# Patient Record
Sex: MALE | Race: WHITE | Employment: OTHER | URBAN - METROPOLITAN AREA
[De-identification: names, ages, dates, MRNs, and addresses within clinical notes are randomized per-mention and may not be internally consistent; named-entity substitution may affect disease eponyms.]

---

## 2022-12-14 ENCOUNTER — OFFICE VISIT (OUTPATIENT)
Dept: NEUROSURGERY | Age: 62
End: 2022-12-14
Payer: MEDICARE

## 2022-12-14 VITALS
BODY MASS INDEX: 27.3 KG/M2 | HEART RATE: 73 BPM | HEIGHT: 71 IN | SYSTOLIC BLOOD PRESSURE: 137 MMHG | OXYGEN SATURATION: 93 % | WEIGHT: 195 LBS | RESPIRATION RATE: 18 BRPM | DIASTOLIC BLOOD PRESSURE: 84 MMHG | TEMPERATURE: 97.6 F

## 2022-12-14 DIAGNOSIS — M54.41 CHRONIC RIGHT-SIDED LOW BACK PAIN WITH RIGHT-SIDED SCIATICA: Primary | ICD-10-CM

## 2022-12-14 DIAGNOSIS — M54.16 LUMBAR RADICULOPATHY: ICD-10-CM

## 2022-12-14 DIAGNOSIS — G89.29 CHRONIC RIGHT-SIDED LOW BACK PAIN WITH RIGHT-SIDED SCIATICA: Primary | ICD-10-CM

## 2022-12-14 PROCEDURE — 99203 OFFICE O/P NEW LOW 30 MIN: CPT | Performed by: STUDENT IN AN ORGANIZED HEALTH CARE EDUCATION/TRAINING PROGRAM

## 2022-12-14 PROCEDURE — 99202 OFFICE O/P NEW SF 15 MIN: CPT

## 2022-12-14 RX ORDER — CYCLOBENZAPRINE HCL 10 MG
10 TABLET ORAL 3 TIMES DAILY PRN
COMMUNITY

## 2022-12-14 RX ORDER — AMLODIPINE BESYLATE 5 MG/1
5 TABLET ORAL DAILY
COMMUNITY

## 2022-12-14 RX ORDER — TAMSULOSIN HYDROCHLORIDE 0.4 MG/1
0.4 CAPSULE ORAL DAILY
COMMUNITY

## 2022-12-14 RX ORDER — FINASTERIDE 5 MG/1
5 TABLET, FILM COATED ORAL DAILY
COMMUNITY

## 2022-12-14 RX ORDER — OMEPRAZOLE 20 MG/1
20 CAPSULE, DELAYED RELEASE ORAL DAILY
COMMUNITY

## 2022-12-14 RX ORDER — MAGNESIUM OXIDE 420 MG/1
TABLET ORAL
COMMUNITY

## 2022-12-14 RX ORDER — PREGABALIN 150 MG/1
150 CAPSULE ORAL 3 TIMES DAILY
COMMUNITY

## 2022-12-14 RX ORDER — MELOXICAM 15 MG/1
15 TABLET ORAL DAILY PRN
Qty: 30 TABLET | Refills: 0 | Status: SHIPPED | OUTPATIENT
Start: 2022-12-14

## 2022-12-14 RX ORDER — ROSUVASTATIN CALCIUM 20 MG/1
20 TABLET, COATED ORAL DAILY
COMMUNITY

## 2022-12-14 RX ORDER — LEVOTHYROXINE SODIUM 0.05 MG/1
50 TABLET ORAL DAILY
COMMUNITY

## 2022-12-14 ASSESSMENT — ENCOUNTER SYMPTOMS
SHORTNESS OF BREATH: 0
PHOTOPHOBIA: 0
BACK PAIN: 1
TROUBLE SWALLOWING: 0
ABDOMINAL PAIN: 0

## 2022-12-14 NOTE — PROGRESS NOTES
Subjective:      Patient ID: Ivelisse Barrios is a 56 y.o. male who has a PMH of previous microdiscectomy done about 20 years ago at [facility]. Today he presents for evaluation of acute on chronic low back pain. Patient states he has always had low back pain but this past Thanksgiving he started to have worsening low back pain that radiates down his right leg. He denies any trauma to the area. He admit to associated numbness and weakness with this pain. Pain is worse with movement. He has tried lyrica, heat, ice and ibuprofen with no relief. He has not tried recent PT for this pain, although has tried PT in the past for his chronic back pain with little relief. He has not tried [facility] for this pain. He denies any bowel or bladder incontinence. He is a current smoker, smoking 1ppd and denies any blood thinner usage. Review of Systems   Constitutional:  Negative for chills and fever. HENT:  Negative for trouble swallowing. Eyes:  Negative for photophobia. Respiratory:  Negative for shortness of breath. Cardiovascular:  Negative for chest pain. Gastrointestinal:  Negative for abdominal pain. Endocrine: Negative for heat intolerance. Genitourinary:  Negative for difficulty urinating and flank pain. Musculoskeletal:  Positive for arthralgias and back pain. Negative for myalgias. Skin:  Negative for wound. Neurological:  Positive for weakness and numbness. Negative for headaches. Psychiatric/Behavioral:  Negative for confusion. Objective:   Physical Exam  HENT:      Head: Normocephalic. Eyes:      Pupils: Pupils are equal, round, and reactive to light. Cardiovascular:      Rate and Rhythm: Normal rate. Pulmonary:      Effort: Pulmonary effort is normal.   Abdominal:      General: There is no distension. Musculoskeletal:         General: Normal range of motion. Cervical back: Normal range of motion. Skin:     General: Skin is warm and dry.    Neurological:      Mental Status: He

## 2022-12-21 ENCOUNTER — HOSPITAL ENCOUNTER (OUTPATIENT)
Dept: GENERAL RADIOLOGY | Age: 62
Discharge: HOME | End: 2022-12-23
Payer: MEDICARE

## 2022-12-21 ENCOUNTER — HOSPITAL ENCOUNTER (OUTPATIENT)
Dept: MRI IMAGING | Age: 62
Discharge: HOME | End: 2022-12-23
Payer: MEDICARE

## 2022-12-21 DIAGNOSIS — M54.41 CHRONIC RIGHT-SIDED LOW BACK PAIN WITH RIGHT-SIDED SCIATICA: ICD-10-CM

## 2022-12-21 DIAGNOSIS — G89.29 CHRONIC RIGHT-SIDED LOW BACK PAIN WITH RIGHT-SIDED SCIATICA: ICD-10-CM

## 2022-12-21 DIAGNOSIS — M54.16 LUMBAR RADICULOPATHY: ICD-10-CM

## 2022-12-21 PROCEDURE — 72148 MRI LUMBAR SPINE W/O DYE: CPT

## 2022-12-21 PROCEDURE — 72120 X-RAY BEND ONLY L-S SPINE: CPT

## 2023-01-03 ENCOUNTER — OFFICE VISIT (OUTPATIENT)
Dept: NEUROSURGERY | Age: 63
End: 2023-01-03
Payer: MEDICARE

## 2023-01-03 VITALS
HEIGHT: 71 IN | TEMPERATURE: 97.2 F | RESPIRATION RATE: 18 BRPM | WEIGHT: 195 LBS | BODY MASS INDEX: 27.3 KG/M2 | HEART RATE: 82 BPM | OXYGEN SATURATION: 96 % | SYSTOLIC BLOOD PRESSURE: 152 MMHG | DIASTOLIC BLOOD PRESSURE: 88 MMHG

## 2023-01-03 DIAGNOSIS — M54.16 LUMBAR RADICULOPATHY: Primary | ICD-10-CM

## 2023-01-03 PROCEDURE — 99204 OFFICE O/P NEW MOD 45 MIN: CPT | Performed by: NEUROLOGICAL SURGERY

## 2023-01-03 PROCEDURE — 99212 OFFICE O/P EST SF 10 MIN: CPT

## 2023-01-03 PROCEDURE — G8419 CALC BMI OUT NRM PARAM NOF/U: HCPCS | Performed by: NEUROLOGICAL SURGERY

## 2023-01-03 PROCEDURE — G8484 FLU IMMUNIZE NO ADMIN: HCPCS | Performed by: NEUROLOGICAL SURGERY

## 2023-01-03 PROCEDURE — 3017F COLORECTAL CA SCREEN DOC REV: CPT | Performed by: NEUROLOGICAL SURGERY

## 2023-01-03 PROCEDURE — 4004F PT TOBACCO SCREEN RCVD TLK: CPT | Performed by: NEUROLOGICAL SURGERY

## 2023-01-03 PROCEDURE — G8427 DOCREV CUR MEDS BY ELIG CLIN: HCPCS | Performed by: NEUROLOGICAL SURGERY

## 2023-01-03 NOTE — PROGRESS NOTES
[address] [facility] [place] NEUROSURGERY  [facility] [place] [id number]       Chief Complaint:   Chief Complaint   Patient presents with    Back Pain     Patient is having back pain  had MRI done and is here for results         HPI:     I had the pleasure of seeing Ac Mitchell today in neurosurgical clinic. As you know this 69-year-old right-handed  father of 3 recently former smoker occasional drinker on disability presents with a longstanding history of low back pain. He underwent lumbar laminectomy at L4-5 approximately 20 years ago with resultant permanent numbness and pain in the left lower extremity. Most recently now he is endorsing right lower extremity radiculopathy that extends from his buttocks down the lateral aspect of his leg anteriorly to the top of his foot. He also endorses numbness of the right calf anteriorly. He denies any change with cough strain or sneeze and denies any loss of bowel or bladder. He has not recently tried physical therapy or epidurals but has done this remotely some 20 years back without much benefit. He was seen and evaluated by our physician assistant and today presents with MRI and flexion-extension x-rays in hand. No past medical history on file. No past surgical history on file. No family history on file.    Social History     Socioeconomic History    Marital status:      Spouse name: Not on file    Number of children: Not on file    Years of education: Not on file    Highest education level: Not on file   Occupational History    Not on file   Tobacco Use    Smoking status: Every Day     Packs/day: 1.00     Years: 2.00     Pack years: 2.00     Types: Cigarettes     Start date: 2020    Smokeless tobacco: Never   Substance and Sexual Activity    Alcohol use: Yes    Drug use: Not Currently    Sexual activity: Not on file   Other Topics Concern    Not on file   Social History Narrative    Not on file     Social Determinants of Health     Financial Resource Strain: Not on file   Food Insecurity: Not on file   Transportation Needs: Not on file   Physical Activity: Not on file   Stress: Not on file   Social Connections: Not on file   Intimate Partner Violence: Not on file   Housing Stability: Not on file       Medications:   Current Outpatient Medications   Medication Sig Dispense Refill    amLODIPine (NORVASC) 5 MG tablet Take 5 mg by mouth daily      omeprazole (PRILOSEC) 20 MG delayed release capsule Take 20 mg by mouth daily      cyanocobalamin 1000 MCG tablet Take 1,000 mcg by mouth daily      pregabalin (LYRICA) 150 MG capsule Take 150 mg by mouth in the morning, at noon, and at bedtime. finasteride (PROSCAR) 5 MG tablet Take 5 mg by mouth daily      tamsulosin (FLOMAX) 0.4 MG capsule Take 0.4 mg by mouth daily      levothyroxine (SYNTHROID) 50 MCG tablet Take 50 mcg by mouth Daily      rosuvastatin (CRESTOR) 20 MG tablet Take 20 mg by mouth daily      Cholecalciferol 50 MCG (2000 UT) TABS Take by mouth      Magnesium Oxide 420 MG TABS Take by mouth      cyclobenzaprine (FLEXERIL) 10 MG tablet Take 10 mg by mouth 3 times daily as needed for Muscle spasms      meloxicam (MOBIC) 15 MG tablet Take 1 tablet by mouth daily as needed for Pain 30 tablet 0     No current facility-administered medications for this visit. Allergies:    Patient has no known allergies. Review of Systems:    Denies any chest pain, headache, dyspnea, recent weight loss, fevers, chills or night sweats. Physical Examination:    BP (!) 152/88   Pulse 82   Temp 97.2 °F (36.2 °C)   Resp 18   Ht 5' 11\" (1.803 m)   Wt 195 lb (88.5 kg)   SpO2 96%   BMI 27.20 kg/m²      On focused neurological examination, he  is awake alert oriented and rationally conversant. Speech is clear and fluent.   Pupils are equal and reactive to light bilaterally, extraocular movements are intact, visual fields are full to confrontation. His  face is symmetric and grossly intact to fine touch. Uvula and tongue are both midline. Shoulder shrug is symmetric and strong. Cervical spine is well aligned and nontender to direct palpation. He  can forward flex, extend , laterally rotate and laterally extend without limitation or pain. Motor examination reveals preserved power in the upper and lower extremities at 5 out of 5 throughout. Reflexes are symmetric and brisk. Plantar responses are downgoing. There is no clonus. She has numbness along the lateral and anterior aspect of his right calf. Straight leg raise is negative. Palpation of the spine reveals no kyphotic or scoliotic gross deformities. He  can forward flex to well below the knee. There is no pain to deep percussion nor palpation. ASSESSMENT:    I personally reviewed Nitza Kapoor's radiographic images, particularly his MRI of the lumbar spine dated 21 December 22 which demonstrates a large right paracentral disc extrusion at L4-5 and with rostral migration with severe compression of the exiting nerve roots. Flexion-extension x-rays dated the 21st as well failed to demonstrate any mobile listhesis. 1. Lumbar radiculopathy  -     [facility] - Physical Therapy, [address] [facility] Tee Hart MD, Pain Medicine, [address]:    I showed Mr. Maisha Nevarez and his wife present with him today as advocate the images and explained the findings. Certainly he deserves a trial of physical therapy with possible epidural injection 2 referrals have been placed as such. My suspicion is that given the size and extent of the disc extrusion he will ultimately require surgery but conservative treatment must be attempted first.  He will return to clinic should he worsen or fail to achieve benefit we will reevaluate in 6 weeks time.       Thank you so much for allowing us to participate in the care of this patient. Return in about 6 weeks (around 2/14/2023) for discuss possible surgical options. Electronically signed by Terri Jarquin MD on 1/3/2023 at 3:48 PM       NOTE: This report was transcribed using voice recognition software.  Every effort was made to ensure accuracy; however, inadvertent computerized transcription errors may be present

## 2023-01-12 ENCOUNTER — EVALUATION (OUTPATIENT)
Dept: PHYSICAL THERAPY | Age: 63
End: 2023-01-12

## 2023-01-12 DIAGNOSIS — M54.16 LUMBAR RADICULOPATHY: Primary | ICD-10-CM

## 2023-01-12 NOTE — PROGRESS NOTES
[facility]   Phone: [phone]   Fax: [phone]           Date:  2023   Patient: Barak Kapoor  : 1960  MRN: [id number]  Referring Provider: Mikael Rodriguez MD  [address] [place] [address]     Medical Diagnosis:   M54.16 (ICD-10-CM) - Lumbar radiculopathy      SUBJECTIVE:     Onset date: 17 years , recent flareup about Thanksgiving time     Mechanism of Injury: Reports back surgery about 16 years ago.  Has had trouble with L LE for 16 years, now R LE is giving pt problems.  Reports has been on Lyrica 16 years.  Reports tingling down B LE's, L LE is worse. Reports last week couldn't stand much. Better this week.     Previous PT: none    Medical Management for Current Problem:  scheduled next week for injections     Chief complaint: pain    Behavior: condition is getting worse    Pain:   Current: 6/10     Worst:'off the chart' /10           Provoking Activities/Positions: stand too long, sit too long, lay too much                  Relieving Activitie/Positions: sleep on L side     Disturbed Sleep: yes  Bladder Dysfunction: no  Bowel Dysfunction: no     Imaging results: MRI LUMBAR SPINE WO CONTRAST    Result Date: 2022  EXAMINATION: MRI OF THE LUMBAR SPINE WITHOUT CONTRAST, 2022 8:43 am TECHNIQUE: Multiplanar multisequence MRI of the lumbar spine was performed without the administration of intravenous contrast. COMPARISON: Lumbar spine radiographs on 2022. HISTORY: ORDERING SYSTEM PROVIDED HISTORY: Chronic right-sided low back pain with right-sided sciatica FINDINGS: BONES/ALIGNMENT: There is straightening of the lumbar spine.  There is no significant alexa or retrolisthesis.  There is an L3 hemangioma.  Endplate degenerative changes are most pronounced at L5-S1. No marrow edema to suggest an acute fracture. SPINAL CORD: The conus tip terminates near the level of the L1 vertebral body.  The cauda equina nerve roots are otherwise  unremarkable. SOFT TISSUES: No paraspinal mass identified. L1-L2: There is no significant disc herniation, spinal canal stenosis or neural foraminal narrowing. L2-L3: There is no significant disc herniation, spinal canal stenosis or neural foraminal narrowing. L3-L4: There is a disc bulge lateralizing to the right. There is a posterior midline annular tear in the disc. There is mild central spinal canal narrowing primarily related to a right paracentral/foraminal disc extrusion at L4-L5 with cephalad migration of disc. This displaces the right L4 nerve root in the lateral recess. Minimal bilateral foraminal narrowing, left side greater than right. L4-L5: There is a right paracentral/foraminal disc extrusion, with cephalad migration of disc measuring 6 mm in AP diameter that extends superiorly up to the level of the L3-L4 disc space with displacement of the right L4 nerve root in the lateral recess. Moderate central spinal canal narrowing. There is contact of the right L5 nerve root in the lateral recess with mild compression. There is facet arthropathy and ligamentum flavum thickening. Severe right and moderate left foraminal narrowing. L5-S1: There is a disc bulge-osteophyte complex lateralizing to the far right. Bilateral facet arthropathy. Mild central spinal canal narrowing. Moderate-severe right and severe left foraminal narrowing. There is compression of the exiting left L5 nerve root. 1. Large right paracentral/foraminal disc extrusion at L4-L5 with cephalad migration of disc, contacting the exiting right L4 and L5 nerve roots. 2. Moderate central spinal canal narrowing at L4-L5 and mild central spinal canal narrowing at L3-L4 and L5-S1. 3. Multilevel foraminal narrowing, as above, greatest on the right at L4-L5 and bilaterally at L5-S1. XR LUMBAR SPINE FLEXION AND EXTENSION ONLY    Result Date: 12/21/2022  EXAMINATION: 3 XRAY VIEWS OF THE LUMBAR SPINE 12/21/2022 7:37 am COMPARISON: None. HISTORY: ORDERING SYSTEM PROVIDED HISTORY: Chronic right-sided low back pain with right-sided sciatica FINDINGS: No spinal instability is evident on flexion or extension. Moderate degenerative disc disease at L5-S1, milder elsewhere. No fracture or dislocation. Nothing else active. Degenerative lumbar spondylosis. No instability is demonstrated. Past Medical History:  No past medical history on file. No past surgical history on file. Medications:   Current Outpatient Medications   Medication Sig Dispense Refill    amLODIPine (NORVASC) 5 MG tablet Take 5 mg by mouth daily      omeprazole (PRILOSEC) 20 MG delayed release capsule Take 20 mg by mouth daily      cyanocobalamin 1000 MCG tablet Take 1,000 mcg by mouth daily      pregabalin (LYRICA) 150 MG capsule Take 150 mg by mouth in the morning, at noon, and at bedtime. finasteride (PROSCAR) 5 MG tablet Take 5 mg by mouth daily      tamsulosin (FLOMAX) 0.4 MG capsule Take 0.4 mg by mouth daily      levothyroxine (SYNTHROID) 50 MCG tablet Take 50 mcg by mouth Daily      rosuvastatin (CRESTOR) 20 MG tablet Take 20 mg by mouth daily      Cholecalciferol 50 MCG (2000 UT) TABS Take by mouth      Magnesium Oxide 420 MG TABS Take by mouth      cyclobenzaprine (FLEXERIL) 10 MG tablet Take 10 mg by mouth 3 times daily as needed for Muscle spasms      meloxicam (MOBIC) 15 MG tablet Take 1 tablet by mouth daily as needed for Pain 30 tablet 0     No current facility-administered medications for this visit. 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 Exercise regimen: none    Hobbies: none    Patient Goals:  surgery/ want it fixed. Contraindications/Precautions: none    OBJECTIVE:     Observations: well nourished male       Gait: increased lateral sway, antalgic     Functional Strength:NT - toe walk, heel walk, and squat.     Range of Motion:    Trunk:    Flexion:  [] Normal   [x] Limited 20%   Extension:  [] Normal   [x] Limited 70%    Right Rotation: [] Normal   [x] Limited 60%   Left Rotation:  [] Normal   [x] Limited 50%   Right Side Bending: [] Normal   [x] Limited 10%   Left Side Bending: [] Normal   [x] Limited 10%    Lower Extremity:   Right:   [x] Normal   [] Limited    Left:   [x] Normal   [] Limited       Strength:     Trunk: decreased ROM, decreased strength   R LE: 4+/5   L LE: 4+/5    Palpation: Tender to palpation over L spine, sacrum , B SI, Non-tender to palpation T spine      Sensation: reports tingling down B LE's. Special Tests:   [] Nerve Root Compression           Right []+ / [] -    Left []+ / [] -  [] Slump           Right []+ / [x] -    Left []+ / [x] -  [] FADIR          Right []+ / [] -    Left []+ / [] -  [] S-I Distraction          Right []+ / [] -    Left []+ / [] -     [] SLR           Right []+ / [] -    Left []+ / [] -     [] AMINTA          Right []+ / [] -    Left []+ / [] -  [] S-I Compression          Right []+ / [] -    Left []+ / [] -   [] Other: []+ / [] -         ASSESSMENT     Outcome Measure:   Modified Oswestry 66% disability    Problems:   Pain reported   ROM decreased   Strength decreased  Decreased functional ability with walking, stairs, standing, sitting    [x] There are no barriers affecting plan of care or recovery    [] Barriers to this patient's plan of care or recovery include. Domestic Concerns:  [x] No  [] Yes:    Short Term goals (3 weeks)  Decrease reported pain to 0-7/10  Increase ROM by 10%  Increase Strength to 5-/5    Able to perform/complete the following functions/tasks: Pt able to walk 15 + minutes with min pain/limitation. Pt able to go up/down 5 steps with min pain/limitation. Pt able to stand 10 + minutes with min pain/limitation.     Oswestry Low Back Disability Questionnaire 50% disability    Long Term goals (6 weeks)  Decrease reported pain to 0-4/10  Increase ROM by 20%  Increase Strength to 5/5   Able to perform/complete the following functions/tasks: Pt able to walk 30 + minutes with no to min pain/limitation. Pt able to go up/down flight of steps with no to min pain/limitation. Pt able to stand 15 + minutes with no to min pain/limitation. Oswestry Low Back Disability Questionnaire 40% disability   Independent with Home Exercise Programs    Rehab Potential: [x] Good  [x] Fair  [] Poor    PLAN       Treatment Plan:   [x] Therapeutic Exercise  [x] Therapeutic Activity  [x] Neuromuscular Re-education   [x] Gait Training  [x] Balance Training  [x] Aerobic conditioning  [x] Manual Therapy  [x] Massage/Fascial release   [] Work/Sport specific activities    [] Pain Neuroscience [x] Cold/hotpack  [] Vasocompression  [x] Electrical Stimulation  [x] Lumbar/Cervical Traction  [x] Ultrasound   [] Iontophoresis: 4 mg/mL Dexamethasone Sodium Phosphate 40-80 mAmin        [x] Instruction in HEP      []  Medication allergies reviewed for use of Dexamethasone Sodium Phosphate 4mg/ml  with iontophoresis treatments. Patient is not allergic. The following CPT codes are likely to be used in the care of this patient: 54644 PT Evaluation: Low Complexity   43358 PT Re-Evaluation   46194 Therapeutic Exercise   94939 Neuromuscular Re-Education   78668 Therapeutic Activities   18680 Manual Therapy   43964 Mechanical Traction   29118 Gait Training    Electrical Stimulation  38074 US      Suggested Professional Referral: [x] No  [] Yes:     Patient Education:  [x] Plans/Goals, Risks/Benefits discussed  [x] Home exercise program  Method of Education: [x] Verbal  [x] Demo  [x] Written  Comprehension of Education:  [x] Verbalizes understanding. [x] Demonstrates understanding. [] Needs Review. [] Demonstrates/verbalizes understanding of HEP/Ed previously given.     Frequency:  2 days per week for 6 weeks    Patient understands diagnosis/prognosis and consents to treatment, plan and goals: [x] Yes    [] No     Thank you for the opportunity to work with your patient. If you have questions or comments, please contact me at numbers listed above. Electronically signed by: Paul Allen, PT DPT [id number]         Please sign Physician's Certification and return to: [address]  [address] [facility] [place] [address]  Dept: [phone]  Dept Fax: [phone]: [phone] Certification / Comments     Frequency/Duration 2 days per week for 6 weeks. Certification period from 1/12/2023  to 2/24/2023. I have reviewed the Plan of Care established for skilled therapy services and certify that the services are required and that they will be provided while the patient is under my care.     Physician's Comments/Revisions:               Physician's Printed Name:                                           [de-identified] Signature:                                                               Date:

## 2023-01-12 NOTE — PROGRESS NOTES
[address] [facility]   Phone: [phone]   Fax: [phone]      Physical Therapy Treatment Note    Date: 2023  Patient Name: Sheila Chambers  : 1960   MRN: [id number]  [facility]: years   DOSx: years   Referring Provider: Viki Cedillo MD  [address] [place] [address]. [facility],  [address] [facility]     Medical Diagnosis:   M54.16 (ICD-10-CM) - Lumbar radiculopathy    Outcome Measure:  Oswestry 66%    S: see eval  O:  Time 5147-1310     Visit  Repeat outcome measure at mid point and end. Pain 6/10     ROM      Modalities      MH + ES            Exercise      ALL EXERCISE DONE WITH DRAW-IN TECHNIQUE                            Functional activities To aid in reaching , pushing, pulling tasks at home     ROWS: H  \"    ROWS: M  \"    ROWS: L  \"    Obliques - high  \"    Obliques - low  \"     THEREX     Bike      Punches      Lat pulldowns      Triceps ext standing      Marching            Trunk ext TB      Trunk flex TB      Hip abd      Hip EXT      TG Squats                  A:  Tolerated fairly well.       P: Continue with rehab plan  Mary Ann Connell, PT  PT DPT [id number]    Treatment Charges: Mins Units   Initial Evaluation 30 1   Re-Evaluation     Ther Exercise         TE     Manual Therapy     MT     Ther Activities        TA     Gait Training          GT     Neuro Re-education NR     Modalities     Non-Billable Service Time     Other     Total Time/Units 30 1

## 2023-01-16 ENCOUNTER — TREATMENT (OUTPATIENT)
Dept: PHYSICAL THERAPY | Age: 63
End: 2023-01-16
Payer: MEDICARE

## 2023-01-16 DIAGNOSIS — M54.16 LUMBAR RADICULOPATHY: Primary | ICD-10-CM

## 2023-01-16 PROCEDURE — 97110 THERAPEUTIC EXERCISES: CPT | Performed by: PHYSICAL THERAPIST

## 2023-01-16 NOTE — PROGRESS NOTES
[address] [facility]   Phone: [phone]   Fax: [phone]      Physical Therapy Treatment Note    Date: 2023  Patient Name: Gray Devries  : 1960   MRN: [id number]  [facility]: years   DOSx: years   Referring Provider: Garrick Gandara MD  [address] [place] [address]. [place],  [address] [facility]     Medical Diagnosis:   M54.16 (ICD-10-CM) - Lumbar radiculopathy    Outcome Measure:  Oswestry 66%    S: reports pain -8/10 today, 'about the same.'   O:  Time 1200 - 1231     Visit  Repeat outcome measure at mid point and end. Pain See above. ROM      Modalities      MH + ES            Exercise      ALL EXERCISE DONE WITH DRAW-IN TECHNIQUE                            Functional activities To aid in reaching , pushing, pulling tasks at home     ROWS: H  \"    ROWS: M 2 x 10   \"    ROWS: L  \"    Obliques - high  \"    Obliques - low  \"     THEREX     Bike 8 minutes      Punches      Lat pulldowns 2 x 10       Triceps ext standing      Marching      Hip adduction  20 x  Ball  te   Sit To stands  10x  No UE support, blue foam     PPT  20x      Hip abd 20x  GTB     Hip EXT standing  10 x      LTR  10x B      Supine marching with core activation  10 x B      Bridges  X 10      A:  Tolerated fairly well.       P: Continue with rehab plan  Maryjane Mcgill, PT  PT DPT [id number]    Treatment Charges: Mins Units   Initial Evaluation     Re-Evaluation     Ther Exercise         TE 31 2   Manual Therapy     MT     Ther Activities        TA     Gait Training          GT     Neuro Re-education NR     Modalities     Non-Billable Service Time     Other     Total Time/Units 31 2

## 2023-01-18 ENCOUNTER — OFFICE VISIT (OUTPATIENT)
Dept: PAIN MANAGEMENT | Age: 63
End: 2023-01-18
Payer: MEDICARE

## 2023-01-18 VITALS
WEIGHT: 200 LBS | TEMPERATURE: 96.6 F | SYSTOLIC BLOOD PRESSURE: 124 MMHG | BODY MASS INDEX: 28 KG/M2 | OXYGEN SATURATION: 97 % | RESPIRATION RATE: 18 BRPM | HEART RATE: 83 BPM | DIASTOLIC BLOOD PRESSURE: 78 MMHG | HEIGHT: 71 IN

## 2023-01-18 DIAGNOSIS — M54.16 LUMBAR RADICULOPATHY: Primary | ICD-10-CM

## 2023-01-18 DIAGNOSIS — G62.9 PERIPHERAL POLYNEUROPATHY: ICD-10-CM

## 2023-01-18 DIAGNOSIS — M47.816 LUMBAR SPONDYLOSIS: ICD-10-CM

## 2023-01-18 PROCEDURE — 3017F COLORECTAL CA SCREEN DOC REV: CPT | Performed by: STUDENT IN AN ORGANIZED HEALTH CARE EDUCATION/TRAINING PROGRAM

## 2023-01-18 PROCEDURE — 4004F PT TOBACCO SCREEN RCVD TLK: CPT | Performed by: STUDENT IN AN ORGANIZED HEALTH CARE EDUCATION/TRAINING PROGRAM

## 2023-01-18 PROCEDURE — G8427 DOCREV CUR MEDS BY ELIG CLIN: HCPCS | Performed by: STUDENT IN AN ORGANIZED HEALTH CARE EDUCATION/TRAINING PROGRAM

## 2023-01-18 PROCEDURE — G8419 CALC BMI OUT NRM PARAM NOF/U: HCPCS | Performed by: STUDENT IN AN ORGANIZED HEALTH CARE EDUCATION/TRAINING PROGRAM

## 2023-01-18 PROCEDURE — G8484 FLU IMMUNIZE NO ADMIN: HCPCS | Performed by: STUDENT IN AN ORGANIZED HEALTH CARE EDUCATION/TRAINING PROGRAM

## 2023-01-18 PROCEDURE — 99204 OFFICE O/P NEW MOD 45 MIN: CPT | Performed by: STUDENT IN AN ORGANIZED HEALTH CARE EDUCATION/TRAINING PROGRAM

## 2023-01-18 RX ORDER — SODIUM CHLORIDE 9 MG/ML
INJECTION, SOLUTION INTRAVENOUS PRN
OUTPATIENT
Start: 2023-01-18

## 2023-01-18 RX ORDER — SODIUM CHLORIDE 0.9 % (FLUSH) 0.9 %
5-40 SYRINGE (ML) INJECTION PRN
OUTPATIENT
Start: 2023-01-18

## 2023-01-18 RX ORDER — SODIUM CHLORIDE 0.9 % (FLUSH) 0.9 %
5-40 SYRINGE (ML) INJECTION EVERY 12 HOURS SCHEDULED
OUTPATIENT
Start: 2023-01-18

## 2023-01-18 NOTE — PROGRESS NOTES
Patient:  Dayna Pastrana,  1960  Date of Service:  23      Do you currently have any of the following:    Fever: No  Headache:  No  Cough: No  Shortness of breath: No  Exposed to anyone with these symptoms: No       Patient presents to [place] [address] with complaints of back pain  pain that started 18 years ago and has been getting worse. He states the pain began following No specific cause    Pain is constant and is described as aching, throbbing, and stabbing. He rates the pain as a 9/10 on his worst day , 6/10 on his best day, and a 7/10 on average on the VAS scale. Pain does radiate to right leg and left leg. He  has numbness, tingling of the right leg and left leg. Alleviating factors include: nothing. Aggravating factors include:  walking, standing, sitting, lifting. He states that the pain does keep him from sleeping at night. He took his last dose of Lyrica this morning. He is not on NSAIDS and  is not on anticoagulation medications   Previous treatments: Physical Therapy, Nerve block, Epidural Steroid Injection, and Surgery . Personal Expectations from this treatment: increase activity and decrease pain    /78   Pulse 83   Temp (!) 96.6 °F (35.9 °C) (Temporal)   Resp 18   Ht 5' 11\" (1.803 m)   Wt 200 lb (90.7 kg)   SpO2 97%   BMI 27.89 kg/m²     No LMP for male patient.

## 2023-01-18 NOTE — PROGRESS NOTES
[address] Pain Medicine  90 [facility], [address] [place] [address]  Pain Medicine Consult Note    Patient:  Bishop Steiner,  1960  Date of Service:  23  Requesting Physician:  Trixie Capellan MD  Chief Complaint: New Patient (Lumbar )      HISTORY OF PRESENT ILLNESS:      Mr. Bishop Steiner is a 57 y.o. male presented today for evaluation of  LBP and b/l LE Pain that has been ongoing for the past 3 months    He has a PMH of HTN, hypothyroidism, BPH, GERD, HLD and peripheral neuropathy. [place] He is not diabetic. He had an 2006 that involved an L5-S1 diskectomy performed in [place], [place], by Dr. Mary Vines. Since the surgery has had permanent numbness/tingling/pain in the LLE from the buttock to the anterior thigh to the dorsum of the foot. There is also some right calf numbness. He was evaluated by dr. Casey Manley for this issue and was referred to us and PT and would be a surgical candidate should he fail conservative therapy first.    Pain is constant and is described as aching, throbbing, and stabbing. He rates the pain as a 9/10 on his worst day , 6/10 on his best day, and a 7/10 on average on the VAS scale. Pain does radiate to right leg and left leg. He  has numbness, tingling of the right leg and left leg. Alleviating factors include: nothing. Aggravating factors include:  walking, standing, sitting, lifting. He states that the pain does keep him from sleeping at night. He took his last dose of Lyrica this morning. He is on NSAIDs (Mobic) and  is not on anticoagulation medications     Previous treatments: Physical Therapy, Nerve block, Epidural Steroid Injection, and Surgery .       Previous treatments:      Physical Therapy : yes, enrolled currently    Chiropractic treatment: no   Current Medications:  NSAID's : yes - meloxicam   Membrane stabilizers : yes - lyrica 150 TID   Opioids : no   Adjuvants or Others : yes, flexeril    TENS Unit: no   Surgeries: yes, L4/5 Laminectomy    Interventional Pain procedures/ nerve blocks: yes, year ago      Current Medications:   Cholecalciferol, Magnesium Oxide, amLODIPine, cyanocobalamin, cyclobenzaprine, finasteride, levothyroxine, meloxicam, omeprazole, pregabalin, rosuvastatin, and tamsulosin     Social History:  Work Hx: disability   Currently in Litigation: no    H/O Smoking: quit 2023   H/O alcohol abuse : no   H/O Illicit drug use : occasional marijuana   Social History     Substance and Sexual Activity   Drug Use Not Currently        Personal Expectations from this treatment:  Decrease pain and/or swelling Pain control   Opioid Agreement:   Renewal date:      Last Urine Screen:   No results found for: LABAMPH, BARBSCNU, LABBENZ, CANSU, COCAIMETSCRU, OPIATESCREENURINE, PHENCYCLIDINESCREENURINE, LABMETH, OXYCODONEUR, FENTSCRUR, DSCOMMENT    Imaging:   XR Lumbar flex/ex      FINDINGS:   No spinal instability is evident on flexion or extension.  Moderate   degenerative disc disease at L5-S1, milder elsewhere.  No fracture or   dislocation.  Nothing else active.           Impression   Degenerative lumbar spondylosis.  No instability is demonstrated.         MRI Result (most recent):  MRI LUMBAR SPINE WO CONTRAST 12/21/2022    Narrative  EXAMINATION:  MRI OF THE LUMBAR SPINE WITHOUT CONTRAST, 12/21/2022 8:43 am    TECHNIQUE:  Multiplanar multisequence MRI of the lumbar spine was performed without the  administration of intravenous contrast.    COMPARISON:  Lumbar spine radiographs on 12/21/2022.    HISTORY:  ORDERING SYSTEM PROVIDED HISTORY: Chronic right-sided low back pain with  right-sided sciatica    FINDINGS:  BONES/ALIGNMENT: There is straightening of the lumbar spine.  There is no  significant alexa or retrolisthesis.  There is an L3 hemangioma.  Endplate  degenerative changes are most pronounced at L5-S1.    No marrow edema to suggest an acute fracture.    SPINAL CORD: The conus tip terminates near the level of the L1  vertebral  body. The cauda equina nerve roots are otherwise unremarkable. SOFT TISSUES: No paraspinal mass identified. L1-L2: There is no significant disc herniation, spinal canal stenosis or  neural foraminal narrowing. L2-L3: There is no significant disc herniation, spinal canal stenosis or  neural foraminal narrowing. L3-L4: There is a disc bulge lateralizing to the right. There is a posterior  midline annular tear in the disc. There is mild central spinal canal  narrowing primarily related to a right paracentral/foraminal disc extrusion  at L4-L5 with cephalad migration of disc. This displaces the right L4 nerve  root in the lateral recess. Minimal bilateral foraminal narrowing, left side  greater than right. L4-L5: There is a right paracentral/foraminal disc extrusion, with cephalad  migration of disc measuring 6 mm in AP diameter that extends superiorly up to  the level of the L3-L4 disc space with displacement of the right L4 nerve  root in the lateral recess. Moderate central spinal canal narrowing. There  is contact of the right L5 nerve root in the lateral recess with mild  compression. There is facet arthropathy and ligamentum flavum thickening. Severe right and moderate left foraminal narrowing. L5-S1: There is a disc bulge-osteophyte complex lateralizing to the far  right. Bilateral facet arthropathy. Mild central spinal canal narrowing. Moderate-severe right and severe left foraminal narrowing. There is  compression of the exiting left L5 nerve root. Impression  1. Large right paracentral/foraminal disc extrusion at L4-L5 with cephalad  migration of disc, contacting the exiting right L4 and L5 nerve roots. 2. Moderate central spinal canal narrowing at L4-L5 and mild central spinal  canal narrowing at L3-L4 and L5-S1.  3. Multilevel foraminal narrowing, as above, greatest on the right at L4-L5  and bilaterally at L5-S1.       CT Result (most recent):  No results found for this or any previous visit from the past 3650 days. Pertinent Labs:   No results found for: BUN, CREATININE, GLUCOSE, AST, ALT, LABA1C, INR, PTT, PLT, MRSAC    Past Medical History:   Diagnosis Date    Low back pain        Past Surgical History:   Procedure Laterality Date    BACK SURGERY         Prior to Admission medications    Medication Sig Start Date End Date Taking? Authorizing Provider   amLODIPine (NORVASC) 5 MG tablet Take 5 mg by mouth daily   Yes Historical Provider, MD   omeprazole (PRILOSEC) 20 MG delayed release capsule Take 20 mg by mouth daily   Yes Historical Provider, MD   cyanocobalamin 1000 MCG tablet Take 1,000 mcg by mouth daily   Yes Historical Provider, MD   pregabalin (LYRICA) 150 MG capsule Take 150 mg by mouth in the morning, at noon, and at bedtime.    Yes Historical Provider, MD   finasteride (PROSCAR) 5 MG tablet Take 5 mg by mouth daily   Yes Historical Provider, MD   tamsulosin (FLOMAX) 0.4 MG capsule Take 0.4 mg by mouth daily   Yes Historical Provider, MD   levothyroxine (SYNTHROID) 50 MCG tablet Take 50 mcg by mouth Daily   Yes Historical Provider, MD   rosuvastatin (CRESTOR) 20 MG tablet Take 20 mg by mouth daily   Yes Historical Provider, MD   Cholecalciferol 50 MCG (2000 UT) TABS Take by mouth   Yes Historical Provider, MD   Magnesium Oxide 420 MG TABS Take by mouth   Yes Historical Provider, MD   cyclobenzaprine (FLEXERIL) 10 MG tablet Take 10 mg by mouth 3 times daily as needed for Muscle spasms   Yes Historical Provider, MD   meloxicam (MOBIC) 15 MG tablet Take 1 tablet by mouth daily as needed for Pain 12/14/22  Yes TAMMY Patterson       No Known Allergies    Social History     Tobacco Use    Smoking status: Every Day     Packs/day: 1.00     Years: 2.00     Pack years: 2.00     Types: Cigarettes     Start date: 2020    Smokeless tobacco: Never   Substance Use Topics    Alcohol use: Yes    Drug use: Not Currently       family history includes Cancer in his father; Diabetes in his mother. REVIEW OF SYSTEMS:   Patient specifically denies fever/chills, chest pain, shortness of breath, new bowel or bladder complaints. All other review of systems was negative except as noted above. PHYSICAL EXAMINATION:    /78   Pulse 83   Temp (!) 96.6 °F (35.9 °C) (Temporal)   Resp 18   Ht 5' 11\" (1.803 m)   Wt 200 lb (90.7 kg)   SpO2 97%   BMI 27.89 kg/m²     General:  Pleasant in no distress and A & O x 3, Build:Normal Weight, Function: Rises from seated position easily     HEENT:normocephalic, atraumatic, Pupils regular, round, equal Sclera: icterus absent      Lungs: Breathing:normal breath pattern, unlabored    CVS: RRR, pulses intact b/l    Abdomen: non-distended and normal Non tender, no guarding. Cervical spine: Inspection: normal   Palpation: No tenderness over the midline and paraspinal area, bilaterally   Range of motion: Normal flexion, extension, rotation bilaterally and is not painful. Spurling's: negative bilaterally   Maya's: negative bilaterally     Thoracic spine: Inspection: normal   Palpation:No tenderness over the midline and paraspinal area, bilaterally  Range of motion: normal in flexion, extension rotation bilateral and is not painful. Lumbar spine: Inspection: normal and surgical incision scar   Spine Range of motion: Normal flexion, pain with extension Normal, Lateral bending, and rotation bilaterally reduced is somewhat painful. Palpation:tenderness paravertebral muscles. Facet Loading: left-negative and right-negative.     Musculoskeletal:  Sacroiliac joint tenderness No bilaterally   AMINTA test: negative bilaterally   Gaenslen's test:negative bilaterally  Piriformis tenderness: negative bilaterally   SLR : positive right   Trochanteric bursa tenderness: negative bilaterally   CVA tenderness: No      Extremities:  Tremors: None bilaterally upper and lower   Range of motion:  grossly normal  Shoulders: Generally normal shoulders , Hips: no pain with internal rotation of hips   Varicose veins: absent    Pulses: present Lt radial   Cyanosis: none   Edema: none x all 4 extremities     Neurological: Sensory:normal to light touch except for decreased sensation over the latera/anterior right calf , CN 2-12 grossly intact     MOTOR Left (x/5) Right (x/5)   Shoulder Abduction (C5)  5 5   Biceps - Elbow Flexion (C6)  5 5   Triceps - Elbow Extension (C7)  5 5   Hand  (C8)  5 5   Iliopsoas - Hip flex /Abd (L2)  5 5   Quadriceps - Knee Ext (L3)  5 5   Ant Tibialis - Ankle Dorsiflex (L4)  5 5   Post Tibialis - Hip Ext/Abd Foot dorsiflex (L5)  5 5   Gastrocnemius - Plantar flex (S1)  5 5     REFLEXES Left Right   Brachioradialis (C5/6)  2+ 2+   Biceps (C5/6)  2+ 2+   Triceps (C7)  2+ 2+   Quadriceps / Patellar (L4)  2+ 2+   Achilles (S1)  2+ 2+     Gait:normal    Dermatology: Skin:no unusual rashes    Impression:  Assessment/Plan:  Diagnoses and all orders for this visit:  Lumbar radiculopathy  Lumbar spondylosis  Peripheral polyneuropathy  Other orders  -     Initiate PAT Protocol; Future  -     Vital signs per unit routine; Standing  -     Diet NPO Exceptions are: Sips of Water with Meds; Standing  -     Verify discontinuation of anticoagulants/antiplatelets unless otherwise specified by physician; Standing  -     Void on call to OR; Standing  -     Verify pre-procedure history and physical completed; Standing  -     Procedure Consent; Standing  -     Anesthesia Type Request; Standing  -     Place intermittent pneumatic compression device; Standing  -     sodium chloride flush 0.9 % injection 5-40 mL  -     sodium chloride flush 0.9 % injection 5-40 mL  -     0.9 % sodium chloride infusion  -     Full Code; Standing    57 y.o. male with h/o  HTN, hypothyroidism, BPH, GERD, HLD and peripheral neuropathy who presents with  LBP and b/l LE Pain that has been ongoing for the past 3 months.  He is s/p L5-S1 diskectomy performed in [place], [place], by  Allison Mendez in 10/2006 and has had permanent numbness/tingling/pain in the LLE from the buttock to the anterior thigh to the dorsum of the foot. He was evaluated by dr. Ian Richardson for this issue and was referred to us and PT and would be a surgical candidate should he fail conservative therapy first.    He is currently enrolled in PT, taking Lyrica 150mg TID, and meloxicam as needed. MRI Lumbar spine (reviewed with pt) was significant for  Large right paracentral/foraminal disc extrusion at L4-L5 with cephalad migration of disc, contacting the exiting right L4 and L5 nerve roots, as well as moderate central spinal canal narrowing at L4-L5. There was also Multilevel foraminal narrowing, as above, greatest on the right at L4-L5 and bilaterally at L5-S1. XR Flex /ex showed no instability. PE was significant for +SLR on the right, mild weakness on the RLE and decreased sensation over the right calf. Given his physical exam findings and image findings, he will likely benefit from an GERMAIN. After going through the records and images, I am not seeing any Laminectomy that was done at the L4-5 level, reviewed with Neuroradiologist as well, so we will schedule him for an L4-5 GERMAIN Right paramedian. R/b/a discussed and he is understanding and in agreement. He will hold his meloxicam for the procedure. Should he continue to have pain after GERMAIN and PT, he will need be re-evaluated by NSGY for possible surgical intervention. We will see him back 4 weeks post procedure. Plan:   Therapy: continue PT  Imaging: no new  Medications: no new, continue lyrica as prescribed by other MDs  Interventions: Schedule for Right paramedian L4/5 GERMAIN -r/b/a discussed - hold meloxicam  Consults: follow up NSGY  Follow up: 4 weeks  Urine screen today: no   Previous Records/Imaging: Obtain full pertinent and past medical records, including Imaging CDs and radiology reports.     Counseling :  Patient encouraged to stay active and to watch/lose weight   Encouraged to continue Regular home exercise program as tolerated - stretching / strengthening. Smoking cessation counseling : no   Treatment plan discussed with the patient including medication and procedure side effects. We discussed with the patient that combining opioids, benzodiazepines, alcohol, illicit drugs or sleep aids increases the risk of respiratory depression including death. We discussed that these medications may cause drowsiness, sedation or dizziness and have counseled the patient not to drive or operate machinery. We have discussed that these medications will be prescribed only by one provider. We have discussed with the patient about age related risk factors and have thoroughly discussed the importance of taking these medications as prescribed. The patient verbalizes understanding. I spent a total of 49 minutes on the date of the service which included preparing to see the patient, face-to-face patient care, completing clinical documentation, obtaining and/or reviewing separately obtained history, performing a medically appropriate exam, counseling and educating the patient/family/caregiver and ordering medications, tests, or procedures. NOTE: The above documentation was prepared using voice recognition software. Every attempt was made to ensure accuracy but there may be spelling, grammatical, and contextual errors. Zofia Guaman MD    Controlled Substances Monitoring:   No flowsheet data found. OARRS report reviewed 1/17/23   CC: Marce Read MD  [address] [facility].   [facility],  [address] [facility]   Aline Molina, APRN - CNP   [id number]  [address] [facility]

## 2023-01-19 ENCOUNTER — TELEPHONE (OUTPATIENT)
Dept: PAIN MANAGEMENT | Age: 63
End: 2023-01-19

## 2023-01-19 ENCOUNTER — TREATMENT (OUTPATIENT)
Dept: PHYSICAL THERAPY | Age: 63
End: 2023-01-19

## 2023-01-19 DIAGNOSIS — M54.16 LUMBAR RADICULOPATHY: Primary | ICD-10-CM

## 2023-01-19 NOTE — PROGRESS NOTES
[facility]  INSTRUCTIONS  . .......................................................................................................................................... [x] Parking the day of Surgery is located in the [facility].   Upon entering the door, make immediate right into the surgery reception room    [x]  Bring photo ID and insurance card     [x] You may have a light breakfast day of procedure    [x]  Wear loose comfortable clothing    [x]  Please follow instructions for medications as given per Dr's office    [x] You can expect a call the business day prior to procedure to notify you of your arrival time     [x] Please arrange for     []  Other instructions

## 2023-01-19 NOTE — PROGRESS NOTES
[address] [facility]   Phone: [phone]   Fax: [phone]      Physical Therapy Treatment Note    Date: 2023  Patient Name: Juan Manuel Lainez  : 1960   MRN: [id number]  [facility]: years   DOSx: years   Referring Provider: Mukesh Summers MD  123 [facility]. [facility],  [address] [facility]     Medical Diagnosis:   M54.16 (ICD-10-CM) - Lumbar radiculopathy    Outcome Measure:  Oswestry 66%    S: reports pain -8/10. Numbness and tingling down B Legs    O:  Time 1420 - 1458     Visit 3/12 Repeat outcome measure at mid point and end. Pain See above. ROM      Modalities      MH + ES            Exercise      ALL EXERCISE DONE WITH DRAW-IN TECHNIQUE                            Functional activities To aid in reaching , pushing, pulling tasks at home     ROWS: H  \"    ROWS: M 2 x 10   \"    ROWS: L  \"    Obliques - high  \"    Obliques - low  \"     THEREX     Bike 10 minutes      Punches      Lat pulldowns 2 x 10       Triceps ext standing      Marching      Hip adduction  20 x  Ball  te   Sit To stands  No UE support, blue foam     Repeated extension  2 x 10 No change in tingling to B legs    Repeated extension and to R 2 x 10 No Change in numbness/tingling in B legs    PPT  20x      Hip abd 20x  GTB     Hip EXT standing  10 x      LTR  10x B      Supine marching with core activation      Bridges      A:  Tolerated fairly well.       P: Continue with rehab plan  Maria Isabel Gant, PTA  [id number]  Treatment Charges: Mins Units   Initial Evaluation     Re-Evaluation     Ther Exercise         TE 38 3   Manual Therapy     MT     Ther Activities        TA     Gait Training          GT     Neuro Re-education NR     Modalities     Non-Billable Service Time     Other     Total Time/Units 38 3

## 2023-01-23 ENCOUNTER — TREATMENT (OUTPATIENT)
Dept: PHYSICAL THERAPY | Age: 63
End: 2023-01-23
Payer: MEDICARE

## 2023-01-23 DIAGNOSIS — M54.16 LUMBAR RADICULOPATHY: Primary | ICD-10-CM

## 2023-01-23 PROCEDURE — 97110 THERAPEUTIC EXERCISES: CPT | Performed by: PHYSICAL THERAPIST

## 2023-01-23 NOTE — PROGRESS NOTES
[address] [facility]   Phone: [phone]   Fax: [phone]      Physical Therapy Treatment Note    Date: 2023  Patient Name: Ila Funes  : 1960   MRN: [id number]  [facility]: years   DOSx: years   Referring Provider: Maria Isabel Keyes MD  [address] [place] [address]. [place],  [address]     Medical Diagnosis:   M54.16 (ICD-10-CM) - Lumbar radiculopathy    Outcome Measure:  Oswestry 66%    S: reports pain 6-8/10 depending on the moment down B LE's. Reports getting injection tomorrow morning. O:  Time 1120 -1153     Visit  Repeat outcome measure at mid point and end. Pain See above. ROM      Modalities      MH + ES            Exercise      ALL EXERCISE DONE WITH DRAW-IN TECHNIQUE                            Functional activities To aid in reaching , pushing, pulling tasks at home     ROWS: H  \"    ROWS: M 2 x 10   \"    ROWS: L  \"    Obliques - high  \"    Obliques - low  \"     THEREX     Bike 10 minutes      Punches      Lat pulldowns 2 x 10       Triceps ext standing      Marching with core activation  2 x 1 min GTB    Hip adduction  20 x  Ball  te   Sit To stands  No UE support, blue foam     Repeated extension  2 x 10 No change in tingling to B legs    Repeated extension and to R No Change in numbness/tingling in B legs    PPT  20x      Hip abd 20x  GTB     Hip EXT standing  10 x      LTR  10x B      Supine marching with core activation      Bridges      A:  Tolerated fairly well. Reports no change in pain end of session.      P: Continue with rehab plan  [place], [place]  [id number]  Treatment Charges: Mins Units   Initial Evaluation     Re-Evaluation     Ther Exercise         TE 33 2   Manual Therapy     MT     Ther Activities        TA     Gait Training          GT     Neuro Re-education NR     Modalities     Non-Billable Service Time     Other     Total Time/Units 33 2

## 2023-01-24 ENCOUNTER — HOSPITAL ENCOUNTER (OUTPATIENT)
Dept: GENERAL RADIOLOGY | Age: 63
Setting detail: OUTPATIENT SURGERY
Discharge: HOME | End: 2023-01-26
Attending: STUDENT IN AN ORGANIZED HEALTH CARE EDUCATION/TRAINING PROGRAM
Payer: MEDICARE

## 2023-01-24 ENCOUNTER — HOSPITAL ENCOUNTER (OUTPATIENT)
Age: 63
Setting detail: OUTPATIENT SURGERY
Discharge: HOME | End: 2023-01-24
Attending: STUDENT IN AN ORGANIZED HEALTH CARE EDUCATION/TRAINING PROGRAM | Admitting: STUDENT IN AN ORGANIZED HEALTH CARE EDUCATION/TRAINING PROGRAM
Payer: MEDICARE

## 2023-01-24 VITALS
RESPIRATION RATE: 18 BRPM | DIASTOLIC BLOOD PRESSURE: 84 MMHG | WEIGHT: 200 LBS | OXYGEN SATURATION: 97 % | HEIGHT: 72 IN | TEMPERATURE: 97.8 F | HEART RATE: 68 BPM | SYSTOLIC BLOOD PRESSURE: 154 MMHG | BODY MASS INDEX: 27.09 KG/M2

## 2023-01-24 DIAGNOSIS — R52 PAIN MANAGEMENT: ICD-10-CM

## 2023-01-24 DIAGNOSIS — M54.16 LUMBAR RADICULOPATHY: ICD-10-CM

## 2023-01-24 PROCEDURE — 6360000002 HC RX W HCPCS: Performed by: STUDENT IN AN ORGANIZED HEALTH CARE EDUCATION/TRAINING PROGRAM

## 2023-01-24 PROCEDURE — 2709999900 HC NON-CHARGEABLE SUPPLY: Performed by: STUDENT IN AN ORGANIZED HEALTH CARE EDUCATION/TRAINING PROGRAM

## 2023-01-24 PROCEDURE — 3209999900 FLUORO FOR SURGICAL PROCEDURES

## 2023-01-24 PROCEDURE — 3600000002 HC SURGERY LEVEL 2 BASE: Performed by: STUDENT IN AN ORGANIZED HEALTH CARE EDUCATION/TRAINING PROGRAM

## 2023-01-24 PROCEDURE — 6360000004 HC RX CONTRAST MEDICATION: Performed by: STUDENT IN AN ORGANIZED HEALTH CARE EDUCATION/TRAINING PROGRAM

## 2023-01-24 PROCEDURE — 7100000010 HC PHASE II RECOVERY - FIRST 15 MIN: Performed by: STUDENT IN AN ORGANIZED HEALTH CARE EDUCATION/TRAINING PROGRAM

## 2023-01-24 PROCEDURE — 2500000003 HC RX 250 WO HCPCS: Performed by: STUDENT IN AN ORGANIZED HEALTH CARE EDUCATION/TRAINING PROGRAM

## 2023-01-24 PROCEDURE — 7100000011 HC PHASE II RECOVERY - ADDTL 15 MIN: Performed by: STUDENT IN AN ORGANIZED HEALTH CARE EDUCATION/TRAINING PROGRAM

## 2023-01-24 PROCEDURE — 3600000012 HC SURGERY LEVEL 2 ADDTL 15MIN: Performed by: STUDENT IN AN ORGANIZED HEALTH CARE EDUCATION/TRAINING PROGRAM

## 2023-01-24 RX ORDER — SODIUM CHLORIDE 0.9 % (FLUSH) 0.9 %
5-40 SYRINGE (ML) INJECTION PRN
Status: DISCONTINUED | OUTPATIENT
Start: 2023-01-24 | End: 2023-01-24 | Stop reason: HOSPADM

## 2023-01-24 RX ORDER — METHYLPREDNISOLONE ACETATE 40 MG/ML
INJECTION, SUSPENSION INTRA-ARTICULAR; INTRALESIONAL; INTRAMUSCULAR; SOFT TISSUE PRN
Status: DISCONTINUED | OUTPATIENT
Start: 2023-01-24 | End: 2023-01-24 | Stop reason: ALTCHOICE

## 2023-01-24 RX ORDER — SODIUM CHLORIDE 9 MG/ML
INJECTION, SOLUTION INTRAVENOUS PRN
Status: DISCONTINUED | OUTPATIENT
Start: 2023-01-24 | End: 2023-01-24 | Stop reason: HOSPADM

## 2023-01-24 RX ORDER — SODIUM CHLORIDE 0.9 % (FLUSH) 0.9 %
5-40 SYRINGE (ML) INJECTION EVERY 12 HOURS SCHEDULED
Status: DISCONTINUED | OUTPATIENT
Start: 2023-01-24 | End: 2023-01-24 | Stop reason: HOSPADM

## 2023-01-24 RX ORDER — LIDOCAINE HYDROCHLORIDE 5 MG/ML
INJECTION, SOLUTION INFILTRATION; INTRAVENOUS PRN
Status: DISCONTINUED | OUTPATIENT
Start: 2023-01-24 | End: 2023-01-24 | Stop reason: ALTCHOICE

## 2023-01-24 ASSESSMENT — PAIN DESCRIPTION - DESCRIPTORS: DESCRIPTORS: TINGLING;NUMBNESS

## 2023-01-24 ASSESSMENT — PAIN - FUNCTIONAL ASSESSMENT: PAIN_FUNCTIONAL_ASSESSMENT: 0-10

## 2023-01-24 NOTE — OP NOTE
2023    Patient: Claudine Loredo  :  1960  Age:  57 y.o. Sex:  male     PRE-OPERATIVE DIAGNOSIS: Lumbar disc displacement, lumbar radiculopathy. POST-OPERATIVE DIAGNOSIS: Same. PROCEDURE: Fluoroscopic guided therapeutic lumbar epidural steroid injection at the L4/5 level (# 1). SURGEON:  lEoina Camacho MD    ANESTHESIA: Local    ESTIMATED BLOOD LOSS: None.  ______________________________________________________________________    BRIEF HISTORY:  Claudine Loredo comes in today for first lumbar epidural injection at L4/5 level. The potential complications of this procedure were discussed with him again today. He has elected to undergo the aforementioned procedure. Melissa complete History & Physical examination were reviewed in depth, a copy of which is in the chart. DESCRIPTION OF PROCEDURE:    After confirming written and informed consent, a time-out was performed and Melissa name and date of birth, the procedure to be performed as well as the plan for the location of the needle insertion were confirmed. The patient was brought into the procedure room and placed in the prone position on the fluoroscopy table. A pillow was placed under the patient's lower abdomen/upper pelvis to increase lumbar interlaminar space. Standard monitors were placed, and vital signs were observed throughout the procedure. The area of the lumbar spine was prepped with chloraprep and draped in a sterile manner. The L4/5 interspace was identified and marked under AP fluoroscopy. The skin and subcutaneous tissues at the above level were anesthestized with 0.5% lidocaine. With intermittent fluoroscopy, an # 18 gauge 3 1/2 tuohy epidural needle was inserted and directed toward the interlaminar space. The needle was slowly advanced using loss of resistance technique and 5 cc glass syringe  until the tip of the epidural needle has passed through the ligamentum flavum and entered the epidural space.  AP and lateral fluoroscopic imaging is performed to verify that the epidural needle is properly placed. Negative aspiration of blood and CSF was confirmed. 1 ml of Isovue - M 300  was used for confirmation of even epidural spread under both live and AP fluoroscopy. After negative aspiration, a solution of 3 mL of 0.5 % Lidocaine and 40 mg DepoMedrol was easily injected. The needle was gently removed intact . The patient back was cleaned and a Band-Aid was placed over the needle insertion point. Disposition the patient tolerated the procedure well and there were no complications . Vital signs remained stable throughout the procedure. The patient was escorted to the recovery area where they remained until discharge and written discharge instructions for the procedure were given. Plan: Sung Kunz will return to our pain medicine center as scheduled.      Valorie Gray MD

## 2023-01-24 NOTE — H&P
[address]  Pain Medicine  [place] [address]    Procedure History & Physical      [facility]     HPI:    Patient  is here for LBP, RLE Pain for L4/5 GERMAIN  Labs/imaging studies reviewed   All question and concerns addressed including R/B/A associated with the procedure    Past Medical History:   Diagnosis Date    GERD (gastroesophageal reflux disease)     Hyperlipidemia     Hypertension     Low back pain        Past Surgical History:   Procedure Laterality Date    BACK SURGERY         Prior to Admission medications    Medication Sig Start Date End Date Taking? Authorizing Provider   amLODIPine (NORVASC) 5 MG tablet Take 5 mg by mouth daily    Historical Provider, MD   omeprazole (PRILOSEC) 20 MG delayed release capsule Take 20 mg by mouth daily    Historical Provider, MD   cyanocobalamin 1000 MCG tablet Take 1,000 mcg by mouth daily    Historical Provider, MD   pregabalin (LYRICA) 150 MG capsule Take 150 mg by mouth in the morning, at noon, and at bedtime.     Historical Provider, MD   finasteride (PROSCAR) 5 MG tablet Take 5 mg by mouth daily    Historical Provider, MD   tamsulosin (FLOMAX) 0.4 MG capsule Take 0.4 mg by mouth daily    Historical Provider, MD   levothyroxine (SYNTHROID) 50 MCG tablet Take 50 mcg by mouth Daily    Historical Provider, MD   rosuvastatin (CRESTOR) 20 MG tablet Take 20 mg by mouth daily    Historical Provider, MD   Cholecalciferol 50 MCG (2000 UT) TABS Take by mouth daily    Historical Provider, MD   Magnesium Oxide 420 MG TABS Take by mouth    Historical Provider, MD   cyclobenzaprine (FLEXERIL) 10 MG tablet Take 10 mg by mouth 3 times daily as needed for Muscle spasms    Historical Provider, MD   meloxicam (MOBIC) 15 MG tablet Take 1 tablet by mouth daily as needed for Pain 12/14/22   TAMMY Fraga       No Known Allergies    Social History     Socioeconomic History    Marital status:      Spouse name: Not on file Number of children: Not on file    Years of education: Not on file    Highest education level: Not on file   Occupational History    Not on file   Tobacco Use    Smoking status: Former     Packs/day: 1.00     Years: 2.00     Pack years: 2.00     Types: Cigarettes     Start date: 2020    Smokeless tobacco: Never   Vaping Use    Vaping Use: Never used   Substance and Sexual Activity    Alcohol use: Yes     Comment: occassional    Drug use: Not Currently    Sexual activity: Not on file   Other Topics Concern    Not on file   Social History Narrative    Not on file     Social Determinants of Health     Financial Resource Strain: Not on file   Food Insecurity: Not on file   Transportation Needs: Not on file   Physical Activity: Not on file   Stress: Not on file   Social Connections: Not on file   Intimate Partner Violence: Not on file   Housing Stability: Not on file       Family History   Problem Relation Age of Onset    Diabetes Mother     Cancer Father          REVIEW OF SYSTEMS:    CONSTITUTIONAL:  negative for  fevers, chills, sweats and fatigue    RESPIRATORY:  negative for  dry cough, cough with sputum, dyspnea, wheezing and chest pain    CARDIOVASCULAR:  negative for chest pain, dyspnea, palpitations, syncope    GASTROINTESTINAL:  negative for nausea, vomiting, change in bowel habits, diarrhea, constipation and abdominal pain    MUSCULOSKELETAL: negative for muscle weakness    SKIN: negative for itching or rashes.     BEHAVIOR/PSYCH:  negative for poor appetite, increased appetite, decreased sleep and poor concentration    All other systems negative      PHYSICAL EXAM:    VITALS:  BP (!) 155/86   Pulse 72   Temp 97.8 °F (36.6 °C) (Temporal)   Resp 16   Ht 5' 11.5\" (1.816 m)   Wt 200 lb (90.7 kg)   SpO2 96%   BMI 27.51 kg/m²     CONSTITUTIONAL:  awake, alert, cooperative, no apparent distress, and appears stated age    EYES: PERRLA, EOMI    LUNGS:  No increased work of breathing, no audible wheezing    CARDIOVASCULAR:  regular rate and rhythm    ABDOMEN:  Soft non tender non distended     EXTREMITIES: no signs of clubbing or cyanosis. MUSCULOSKELETAL: negative for flaccid muscle tone or spastic movements. SKIN: gross examination reveals no signs of rashes, or diaphoresis. NEURO: Cranial nerves II-XII grossly intact. No signs of agitated mood.        Assessment/Plan:    LBP, RLE pain for L4/5 GERMAIN

## 2023-01-24 NOTE — DISCHARGE INSTRUCTIONS
Silvio Cooks Dr. Charlyn Chamber Dr. Arthuro Kennedy Block/Radio[facility]  Home Going Instructions    1-Go home, rest for the remainder of the day  2-Please do not lift over 20 pounds the day of the injection  3-If you received sedation No: alcohol, driving, operating lawn mowers, plows, tractors or other dangerous equipment until next morning. Do not make important decisions or sign legal documents for 24 hours. You may experience light headedness, dizziness, nausea or sleepiness after sedation. Do not stay alone. A responsible adult must be with you for 24 hours. You could be nauseated from the medications you have received. Your IV site may be sore and bruised. 4-No dietary restrictions     5-Resume all medications the same day, blood thinners to be resumed 24 hours after injection if you were instructed to stop any. 6-Keep the surgical site clean and dry, you may shower the next morning and remove the      dressing. 7- No sitz baths, tub baths or hot tubs/swimming for 24 hours. 8- If you have any pain at the injection site(s), application of an ice pack to the area should be       helpful, 20 minutes on/20 minutes off for next 48 hours. 9- Call [facility] [place] immediately at if you develop.   Fever greater than 100.4 F  Have bleeding or drainage from the puncture site  Have progressive Leg/arm numbness and or weakness  Loss of control of bowel and or bladder (wet/soil yourself)  Severe headache with inability to lift head  10-You may return to work the next day

## 2023-01-25 ENCOUNTER — TREATMENT (OUTPATIENT)
Dept: PHYSICAL THERAPY | Age: 63
End: 2023-01-25

## 2023-01-25 DIAGNOSIS — M54.16 LUMBAR RADICULOPATHY: Primary | ICD-10-CM

## 2023-01-25 NOTE — PROGRESS NOTES
[address] [facility]   Phone: [phone]   Fax: [phone]      Physical Therapy Treatment Note    Date: 2023  Patient Name: gAustin Andres  : 1960   MRN: [id number]  [facility]: years   DOSx: years   Referring Provider: Kris Berrios MD  [address] [place] [address]. [place],  [facility]     Medical Diagnosis:   M54.16 (ICD-10-CM) - Lumbar radiculopathy    Outcome Measure:  Oswestry 66%    S: reports 7/10 pain today in back. Reports some L knee pain. Reports getting some swelling in knee. Will decrease time on bike. O:  Time [id number]     Visit  Repeat outcome measure at mid point and end. Pain See above. ROM      Modalities      MH + ES            Exercise      ALL EXERCISE DONE WITH DRAW-IN TECHNIQUE                            Functional activities To aid in reaching , pushing, pulling tasks at home     ROWS: H  \"    ROWS: M 2 x 10   \"    ROWS: L  \"    Obliques - high  \"    Obliques - low  \"     THEREX     Bike 5 minutes      Punches      Lat pulldowns 2 x 10   GTB    Triceps ext standing      Marching with core activation   1 min GTB    Hip adduction  20 x  Ball  te   Sit To stands  No UE support, blue foam     Repeated extension  2 x 10     Repeated extension and to R No Change in numbness/tingling in B legs    PPT  20x      Hip abd 20x  GTB     Hip EXT standing  10 x      LTR  10x B      Supine marching with core activation      Resisted gait fwd, backward X 5 each 40# = 10#    Bridges      A:  Tolerated fairly well. Pt reports thinks hip adduction and LTR are helpful. No to mild swelling noted L suprapatellar. Not hot or red. Time on bike shortened. Recommended to pt if has concerns or pain/swelling increases in knee to go to orthopedic walk in clinic for assessment. Pt demonstrates understanding .       P: Continue with rehab plan  [place], [place]  [id number]  Treatment Charges: Mins Units   Initial Evaluation     Re-Evaluation     Ther Exercise TE 36 2   Manual Therapy     MT     Ther Activities        TA     Gait Training          GT     Neuro Re-education NR     Modalities     Non-Billable Service Time     Other     Total Time/Units 36 2

## 2023-01-30 ENCOUNTER — TREATMENT (OUTPATIENT)
Dept: PHYSICAL THERAPY | Age: 63
End: 2023-01-30
Payer: MEDICARE

## 2023-01-30 DIAGNOSIS — M54.16 LUMBAR RADICULOPATHY: Primary | ICD-10-CM

## 2023-01-30 PROCEDURE — 97110 THERAPEUTIC EXERCISES: CPT

## 2023-01-30 PROCEDURE — 97112 NEUROMUSCULAR REEDUCATION: CPT

## 2023-01-30 NOTE — PROGRESS NOTES
[address] [facility]   Phone: [phone]   Fax: [phone]      Physical Therapy Treatment Note    Date: 2023  Patient Name: Isaac Carty  : 1960   MRN: [id number]  [facility]: years   DOSx: years   Referring Provider: Lilli Sunshine MD  [address] [place] [address]. [place],  [address] [facility]     Medical Diagnosis:   M54.16 (ICD-10-CM) - Lumbar radiculopathy    Outcome Measure:  Oswestry 66%    S: reports 7/10 pain today in back. O:  Time [id number]     Visit  Repeat outcome measure at mid point and end. Pain See above. ROM      Modalities      MH + ES            Exercise      ALL EXERCISE DONE WITH DRAW-IN TECHNIQUE                            Functional activities To aid in reaching , pushing, pulling tasks at home     ROWS: H  \"    ROWS: M 2 x 10   RTB    ROWS: L  \"    Obliques - high  \"    Obliques - low  \"     THEREX     Bike 5 minutes      Punches      Lat pulldowns 2 x 10   GTB    Triceps ext standing      Marching with core activation  GTB    Hip adduction  20 x  Ball  te   Sit To stands  No UE support, blue foam     Repeated extension  2 x 10     Repeated extension and to R No Change in numbness/tingling in B legs    PPT  20x      Hip abd 20x  GTB     Hip EXT standing  10 x      LTR  10x B      Supine marching with core activation      Resisted gait fwd, backward X 10 each 40# = 10#    Bridges      A:  Tolerated fairly well.   Pt reports pain is \"about the same\" as on arrival    P: Continue with rehab plan  Leonora Gowers, PTA  [id number]  Treatment Charges: Mins Units   Initial Evaluation     Re-Evaluation     Ther Exercise         TE 30 2   Manual Therapy     MT     Ther Activities        TA     Gait Training          GT     Neuro Re-education NR 8 1   Modalities     Non-Billable Service Time     Other     Total Time/Units 38 3

## 2023-02-02 ENCOUNTER — TREATMENT (OUTPATIENT)
Dept: PHYSICAL THERAPY | Age: 63
End: 2023-02-02
Payer: MEDICARE

## 2023-02-02 DIAGNOSIS — M54.16 LUMBAR RADICULOPATHY: Primary | ICD-10-CM

## 2023-02-02 PROCEDURE — 97110 THERAPEUTIC EXERCISES: CPT

## 2023-02-06 ENCOUNTER — TREATMENT (OUTPATIENT)
Dept: PHYSICAL THERAPY | Age: 63
End: 2023-02-06

## 2023-02-06 DIAGNOSIS — M54.16 LUMBAR RADICULOPATHY: Primary | ICD-10-CM

## 2023-02-06 NOTE — PROGRESS NOTES
[address] [facility]   Phone: [phone]   Fax: [phone]      Physical Therapy Treatment Note    Date: 2023  Patient Name: Kurt Brady  : 1960   MRN: [id number]  [facility]: years   DOSx: years   Referring Provider: No referring provider defined for this encounter. Medical Diagnosis:   M54.16 (ICD-10-CM) - Lumbar radiculopathy    Outcome Measure:  Oswestry 66%    S: reports 8/10 pain today in back. He states he's had increased pain in the last few days. O:  Time [id number]     Visit  Repeat outcome measure at mid point and end. Pain See above. ROM      Modalities      MH + ES            Exercise      ALL EXERCISE DONE WITH DRAW-IN TECHNIQUE                            Functional activities To aid in reaching , pushing, pulling tasks at home     ROWS: H  \"    ROWS: M 2 x 10   GTB    ROWS: L  \"    Lat pull down  2 x 10 GTB    Obliques - low  \"     THEREX     Bike 8 minutes      Punches      Lat pulldowns 2 x 10   GTB    Triceps ext standing      Marching with core activation  GTB    Hip adduction  20 x  Ball  te   Sit To stands  No UE support, blue foam     Repeated extension  2 x 10 Into ball    Repeated extension and to R No Change in numbness/tingling in B legs    PPT  20x      Hip abd 20x  GTB     Hip EXT standing  10 x      LTR  10x B      Supine marching with core activation      Resisted gait fwd, backward 40# = 10#    Scap retraction with ER 2 x 10 gtb    Bridges  X 10      A:  Tolerated fairly well.   Pt reports pain is the same as on arrival.   P: Continue with rehab plan  Sedrick Jacobsen, PTA  [id number]  Treatment Charges: Mins Units   Initial Evaluation     Re-Evaluation     Ther Exercise         TE 30 2   Manual Therapy     MT     Ther Activities        TA 8 1   Gait Training          GT     Neuro Re-education NR     Modalities     Non-Billable Service Time     Other     Total Time/Units 38 3

## 2023-02-09 ENCOUNTER — TREATMENT (OUTPATIENT)
Dept: PHYSICAL THERAPY | Age: 63
End: 2023-02-09

## 2023-02-09 DIAGNOSIS — M54.16 LUMBAR RADICULOPATHY: Primary | ICD-10-CM

## 2023-02-09 NOTE — PROGRESS NOTES
[facility]   Phone: [phone]   Fax: [phone]      Physical Therapy Treatment Note    Date: 2023  Patient Name: Barak Kapoor  : 1960   MRN: [id number]  DOInjury: years   DOSx: years   Referring Provider: Mikael Rodriguez MD  [address] [place] [address]     Medical Diagnosis:   M54.16 (ICD-10-CM) - Lumbar radiculopathy    Outcome Measure:  Oswestry 66%    S: reports 5/10 today, pt reports minimally in back. Most pain is in L knee today.   O:  Time [id number]     Visit  Repeat outcome measure at mid point and end.    Pain See above.      ROM      Modalities      MH + ES            Exercise      ALL EXERCISE DONE WITH DRAW-IN TECHNIQUE                            Functional activities To aid in reaching , pushing, pulling tasks at home     ROWS: H  \"    ROWS: M 2 x 10   GTB    ROWS: L  \"    Lat pull down  2 x 10 GTB    Obliques - low  \"     THEREX     Bike 10 minutes      Punches      Lat pulldowns 2 x 10   GTB    Triceps ext standing      Marching with core activation  GTB    Hip adduction  20 x  Ball  te   Sit To stands  No UE support, blue foam     Repeated extension  2 x 10 Into ball    Repeated extension and to R No Change in numbness/tingling in B legs    PPT  20x      Hip abd 20x  GTB     Hip EXT standing  10 x      LTR  10x B      Supine marching with core activation      Resisted gait fwd, backward 40# = 10#    Scap retraction with ER 2 x 10 gtb    Bridges  X 10      A:  Tolerated fairly well.  Pt reports pain is the same as on arrival.   P: Continue with rehab plan  Debora Kennedy, PTA  [id number]  Treatment Charges: Mins Units   Initial Evaluation     Re-Evaluation     Ther Exercise         TE 30 2   Manual Therapy     MT     Ther Activities        TA 8 1   Gait Training          GT     Neuro Re-education NR     Modalities     Non-Billable Service Time     Other     Total Time/Units 38 3

## 2023-02-15 ENCOUNTER — OFFICE VISIT (OUTPATIENT)
Dept: PAIN MANAGEMENT | Age: 63
End: 2023-02-15
Payer: MEDICARE

## 2023-02-15 ENCOUNTER — TELEPHONE (OUTPATIENT)
Dept: PAIN MANAGEMENT | Age: 63
End: 2023-02-15

## 2023-02-15 VITALS
TEMPERATURE: 98.4 F | OXYGEN SATURATION: 96 % | WEIGHT: 210 LBS | HEART RATE: 75 BPM | RESPIRATION RATE: 16 BRPM | BODY MASS INDEX: 29.4 KG/M2 | DIASTOLIC BLOOD PRESSURE: 80 MMHG | SYSTOLIC BLOOD PRESSURE: 120 MMHG | HEIGHT: 71 IN

## 2023-02-15 DIAGNOSIS — G62.9 PERIPHERAL POLYNEUROPATHY: ICD-10-CM

## 2023-02-15 DIAGNOSIS — G89.29 CHRONIC PAIN OF LEFT KNEE: ICD-10-CM

## 2023-02-15 DIAGNOSIS — M54.16 LUMBAR RADICULOPATHY: Primary | ICD-10-CM

## 2023-02-15 DIAGNOSIS — M25.562 CHRONIC PAIN OF LEFT KNEE: ICD-10-CM

## 2023-02-15 DIAGNOSIS — M47.816 LUMBAR SPONDYLOSIS: ICD-10-CM

## 2023-02-15 PROCEDURE — 99213 OFFICE O/P EST LOW 20 MIN: CPT | Performed by: STUDENT IN AN ORGANIZED HEALTH CARE EDUCATION/TRAINING PROGRAM

## 2023-02-15 PROCEDURE — 1036F TOBACCO NON-USER: CPT | Performed by: STUDENT IN AN ORGANIZED HEALTH CARE EDUCATION/TRAINING PROGRAM

## 2023-02-15 PROCEDURE — G8419 CALC BMI OUT NRM PARAM NOF/U: HCPCS | Performed by: STUDENT IN AN ORGANIZED HEALTH CARE EDUCATION/TRAINING PROGRAM

## 2023-02-15 PROCEDURE — 3017F COLORECTAL CA SCREEN DOC REV: CPT | Performed by: STUDENT IN AN ORGANIZED HEALTH CARE EDUCATION/TRAINING PROGRAM

## 2023-02-15 PROCEDURE — G8484 FLU IMMUNIZE NO ADMIN: HCPCS | Performed by: STUDENT IN AN ORGANIZED HEALTH CARE EDUCATION/TRAINING PROGRAM

## 2023-02-15 PROCEDURE — G8427 DOCREV CUR MEDS BY ELIG CLIN: HCPCS | Performed by: STUDENT IN AN ORGANIZED HEALTH CARE EDUCATION/TRAINING PROGRAM

## 2023-02-15 RX ORDER — SODIUM CHLORIDE 9 MG/ML
INJECTION, SOLUTION INTRAVENOUS PRN
OUTPATIENT
Start: 2023-02-15

## 2023-02-15 RX ORDER — SODIUM CHLORIDE 0.9 % (FLUSH) 0.9 %
5-40 SYRINGE (ML) INJECTION EVERY 12 HOURS SCHEDULED
OUTPATIENT
Start: 2023-02-15

## 2023-02-15 RX ORDER — SODIUM CHLORIDE 0.9 % (FLUSH) 0.9 %
5-40 SYRINGE (ML) INJECTION PRN
OUTPATIENT
Start: 2023-02-15

## 2023-02-15 NOTE — PROGRESS NOTES
[address] Pain Medicine  90 [facility], [address] [place] [address]    Pain Medicine Follow Up Note      Dameon Hagen     Date of Visit:  2/15/2023    CC:  Patient presents for follow up   Chief Complaint   Patient presents with    Follow Up After Procedure     LUMBAR EPIDURAL STEROID INJECTION UNDER FLUOROSCOPIC GUIDANCE AT L4-L5 RIGHT PARAMEDIAN       HPI:    Pain is better. Medication side effects:none. Recent diagnostic testing:none. Recent interventional procedures:L4/5 GERMAIN Right paramedian.fair. Blood Thinners/Anticoagulation:  no  Herbal Supplements: no  Pertinent Allergies: no  Diabetic: no    Previous Plan:  PT - ongoing   Lyrica 150mg BID - no side effects  NSGY - not yet followed up  LESI  - completed, below    Procedures: yes,     1/24/2023 - L4/5 GERMAIN  - 40% relief (mostly on right side, Left side now painful)       Imaging: New: no     XRAY:    MRI:  MRI LUMBAR SPINE WO CONTRAST 12/21/2022    Narrative  EXAMINATION:  MRI OF THE LUMBAR SPINE WITHOUT CONTRAST, 12/21/2022 8:43 am    TECHNIQUE:  Multiplanar multisequence MRI of the lumbar spine was performed without the  administration of intravenous contrast.    COMPARISON:  Lumbar spine radiographs on 12/21/2022. HISTORY:  ORDERING SYSTEM PROVIDED HISTORY: Chronic right-sided low back pain with  right-sided sciatica    FINDINGS:  BONES/ALIGNMENT: There is straightening of the lumbar spine. There is no  significant alexa or retrolisthesis. There is an L3 hemangioma. Endplate  degenerative changes are most pronounced at L5-S1. No marrow edema to suggest an acute fracture. SPINAL CORD: The conus tip terminates near the level of the L1 vertebral  body. The cauda equina nerve roots are otherwise unremarkable. SOFT TISSUES: No paraspinal mass identified. L1-L2: There is no significant disc herniation, spinal canal stenosis or  neural foraminal narrowing.     L2-L3: There is no significant disc herniation, spinal canal stenosis or  neural foraminal narrowing. L3-L4: There is a disc bulge lateralizing to the right. There is a posterior  midline annular tear in the disc. There is mild central spinal canal  narrowing primarily related to a right paracentral/foraminal disc extrusion  at L4-L5 with cephalad migration of disc. This displaces the right L4 nerve  root in the lateral recess. Minimal bilateral foraminal narrowing, left side  greater than right. L4-L5: There is a right paracentral/foraminal disc extrusion, with cephalad  migration of disc measuring 6 mm in AP diameter that extends superiorly up to  the level of the L3-L4 disc space with displacement of the right L4 nerve  root in the lateral recess. Moderate central spinal canal narrowing. There  is contact of the right L5 nerve root in the lateral recess with mild  compression. There is facet arthropathy and ligamentum flavum thickening. Severe right and moderate left foraminal narrowing. L5-S1: There is a disc bulge-osteophyte complex lateralizing to the far  right. Bilateral facet arthropathy. Mild central spinal canal narrowing. Moderate-severe right and severe left foraminal narrowing. There is  compression of the exiting left L5 nerve root. Impression  1. Large right paracentral/foraminal disc extrusion at L4-L5 with cephalad  migration of disc, contacting the exiting right L4 and L5 nerve roots. 2. Moderate central spinal canal narrowing at L4-L5 and mild central spinal  canal narrowing at L3-L4 and L5-S1.  3. Multilevel foraminal narrowing, as above, greatest on the right at L4-L5  and bilaterally at L5-S1. CT:  No results found for this or any previous visit from the past 3650 days.       EMG:    Pertinent Labs:   No results found for: BUN, CREATININE, GLUCOSE, AST, ALT, LABA1C, INR, PTT, PLT, MRSAC    Potential Aberrant Drug-Related Behavior:  no     Urine Drug Screening:   No results found for: Emil Denson, LABBENZ, CANSU, COCAIMETSCRU, Beula Mariner, LABMETH, OXYCODONEUR, FENTSCRUR, DSCOMMENT    Past Medical History:   Diagnosis Date    GERD (gastroesophageal reflux disease)     Hyperlipidemia     Hypertension     Low back pain        Past Surgical History:   Procedure Laterality Date    BACK SURGERY      PAIN MANAGEMENT PROCEDURE Right 1/24/2023    LUMBAR EPIDURAL STEROID INJECTION UNDER FLUOROSCOPIC GUIDANCE AT L4-L5 RIGHT PARAMEDIAN performed by Awais Ha MD at [facility] OR       Prior to Admission medications    Medication Sig Start Date End Date Taking? Authorizing Provider   amLODIPine (NORVASC) 5 MG tablet Take 5 mg by mouth daily   Yes Historical Provider, MD   omeprazole (PRILOSEC) 20 MG delayed release capsule Take 20 mg by mouth daily   Yes Historical Provider, MD   cyanocobalamin 1000 MCG tablet Take 1,000 mcg by mouth daily   Yes Historical Provider, MD   pregabalin (LYRICA) 150 MG capsule Take 150 mg by mouth in the morning, at noon, and at bedtime.    Yes Historical Provider, MD   finasteride (PROSCAR) 5 MG tablet Take 5 mg by mouth daily   Yes Historical Provider, MD   tamsulosin (FLOMAX) 0.4 MG capsule Take 0.4 mg by mouth daily   Yes Historical Provider, MD   levothyroxine (SYNTHROID) 50 MCG tablet Take 50 mcg by mouth Daily   Yes Historical Provider, MD   rosuvastatin (CRESTOR) 20 MG tablet Take 20 mg by mouth daily   Yes Historical Provider, MD   Cholecalciferol 50 MCG (2000 UT) TABS Take by mouth daily   Yes Historical Provider, MD   Magnesium Oxide 420 MG TABS Take by mouth   Yes Historical Provider, MD   cyclobenzaprine (FLEXERIL) 10 MG tablet Take 10 mg by mouth 3 times daily as needed for Muscle spasms   Yes Historical Provider, MD   meloxicam (MOBIC) 15 MG tablet Take 1 tablet by mouth daily as needed for Pain 12/14/22  Yes TAMMY Ramires       No Known Allergies    Social History     Tobacco Use    Smoking status: Former     Packs/day: 1.00     Years: 2.00     Pack years: 2.00 Types: Cigarettes     Start date: 2020    Smokeless tobacco: Never   Vaping Use    Vaping Use: Never used   Substance Use Topics    Alcohol use: Yes     Comment: occassional    Drug use: Not Currently       family history includes Cancer in his father; Diabetes in his mother. REVIEW OF SYSTEMS:     Justin Enamorado denies fever/chills, chest pain, shortness of breath, new bowel or bladder complaints. All other review of systems was negative except as noted above. PHYSICAL EXAMINATION:      /80   Pulse 75   Temp 98.4 °F (36.9 °C) (Temporal)   Resp 16   Ht 5' 11\" (1.803 m)   Wt 210 lb (95.3 kg)   SpO2 96%   BMI 29.29 kg/m²     General:  Pleasant in no distress and A & O x 3, Build:Normal Weight, Function: Rises from seated position easily      HEENT:normocephalic, atraumatic, Pupils regular, round, equal Sclera: icterus absent      Lungs: Breathing:normal breath pattern, unlabored     CVS: RRR, pulses intact b/l     Abdomen: non-distended and normal Non tender, no guarding. Cervical spine: Inspection: normal   Palpation: No tenderness over the midline and paraspinal area, bilaterally   Range of motion: Normal flexion, extension, rotation bilaterally and is not painful. Spurling's: negative bilaterally   Maya's: negative bilaterally      Thoracic spine: Inspection: normal   Palpation:No tenderness over the midline and paraspinal area, bilaterally  Range of motion: normal in flexion, extension rotation bilateral and is not painful. Lumbar spine: Inspection: normal and surgical incision scar   Spine Range of motion: Normal flexion, pain with extension Normal, Lateral bending, and rotation bilaterally reduced is somewhat painful. Palpation:tenderness paravertebral muscles. Facet Loading: left-negative and right-negative.      Musculoskeletal:  Sacroiliac joint tenderness No bilaterally   AMINTA test: negative bilaterally   Gaenslen's test:negative bilaterally  Piriformis tenderness: negative bilaterally   Knee  Left Knee  Mild effusion  TTP over medial compartment  Good ROM  Negative Lachman's   SLR : minimal on the right   Trochanteric bursa tenderness: negative bilaterally   CVA tenderness: No       Extremities:  Tremors: None bilaterally upper and lower   Range of motion:  grossly normal  Shoulders: Generally normal shoulders ,    Hips: no pain with internal rotation of hips   Varicose veins: absent    Pulses: present Lt radial   Cyanosis: none   Edema: none x all 4 extremities      Neurological: Sensory:normal to light touch except for decreased sensation over the latera/anterior right calf , CN 2-12 grossly intact      MOTOR Left (x/5) Right (x/5)   Shoulder Abduction (C5)  5 5   Biceps - Elbow Flexion (C6)  5 5   Triceps - Elbow Extension (C7)  5 5   Hand  (C8)  5 5   Iliopsoas - Hip flex /Abd (L2)  5 5   Quadriceps - Knee Ext (L3)  5 5   Ant Tibialis - Ankle Dorsiflex (L4)  5 5   Post Tibialis - Hip Ext/Abd Foot dorsiflex (L5)  5 5   Gastrocnemius - Plantar flex (S1)  5 5      REFLEXES Left Right   Brachioradialis (C5/6)  2+ 2+   Biceps (C5/6)  2+ 2+   Triceps (C7)  2+ 2+   Quadriceps / Patellar (L4)  2+ 2+   Achilles (S1)  2+ 2+      Gait:normal     Dermatology: Skin:no unusual rashes      Impression:  Assessment/Plan:  Diagnoses and all orders for this visit:  Lumbar radiculopathy  Lumbar spondylosis  Peripheral polyneuropathy  Chronic pain of left knee  -     XR KNEE LEFT (3 VIEWS); Future  Other orders  -     Initiate PAT Protocol;  Future  -     Vital signs per unit routine; Standing  -     Diet NPO Exceptions are: Sips of Water with Meds; Standing  -     Verify discontinuation of anticoagulants/antiplatelets unless otherwise specified by physician; Standing  -     Void on call to OR; Standing  -     Verify pre-procedure history and physical completed; Standing  -     Procedure Consent; Standing  -     Anesthesia Type Request; Standing  -     Place intermittent pneumatic compression device; Standing  -     sodium chloride flush 0.9 % injection 5-40 mL  -     sodium chloride flush 0.9 % injection 5-40 mL  -     0.9 % sodium chloride infusion  -     Full Code; Standing    57 y.o. male with h/o  HTN, hypothyroidism, BPH, GERD, HLD and peripheral neuropathy who presents for follow up of  LBP and b/l LE Pain that has been ongoing for the past 3 months. He is s/p L5-S1 diske[place] in [place], [place], by Dr. Jasmina Pinedo in 10/2006 and has had permanent numbness/tingling/pain in the LLE from the buttock to the anterior thigh to the dorsum of the foot. He is currently enrolled in PT, taking Lyrica 150mg TID, and meloxicam as needed. He is s/p Right paramedian L4/5 GERMAIN on 1/24/23 with 40% pain relief (mostly on right side). Since the injection, his left side has started to bother him more, and feels that he is now compensating in his gait and started to have left knee pain. MRI Lumbar spine (reviewed with pt) was significant for  Large right paracentral/foraminal disc extrusion at L4-L5 with cephalad migration of disc, contacting the exiting right L4 and L5 nerve roots, as well as moderate central spinal canal narrowing at L4-L5. There was also Multilevel foraminal narrowing, as above, greatest on the right at L4-L5 and bilaterally at L5-S1. XR Flex /ex showed no instability. PE was improved, with minimal SLR on the right, TTP over the medial aspect of the left knee, decreased sensation over the right calf. Given he received some relief with his right paramedian GERMAIN L4/5, we will schedule him for repeat GERMAIN at L4/5 left paramedian, given he now has pain on the left and has some residual pain on the right side. We will schedule him for an L4-5 GERMAIN Left  paramedian. R/b/a discussed and he is understanding and in agreement. He will hold his meloxicam for the procedure. We will also obtain XR left knee given the ongoing left knee pain.      Should he continue to have pain after GERMAIN and PT, he will need be re-evaluated by NSGY for possible surgical intervention. We will see him back 4 weeks post procedure. Plan:   Therapy: continue PT  Imaging: XR left knee  Medications: no new, continue lyrica as prescribed by other MDs  Interventions: Schedule for Left paramedian L4/5 GERMAIN -r/b/a discussed - hold meloxicam  Consults: follow up NSGY  Follow up: 4 weeks  Urine screen today: no   Previous Records/Imaging: Obtain full pertinent and past medical records, including Imaging CDs and radiology reports. Counseling :       Counseling :  Patient encouraged to stay active and to watch/lose weight   Encouraged to continue Regular home exercise program as tolerated - stretching / strengthening. Smoking cessation counseling : no   Treatment plan discussed with the patient including medication and procedure side effects, as well as benefits and alternatives and the patient expressed understanding. I spent a total of 29 minutes on the date of the service which included preparing to see the patient, face-to-face patient care, completing clinical documentation, obtaining and/or reviewing separately obtained history, performing a medically appropriate exam, counseling and educating the patient/family/caregiver and ordering medications, tests, or procedures. NOTE: The above documentation was prepared using voice recognition software. Every attempt was made to ensure accuracy but there may be spelling, grammatical, and contextual errors. Rinku Mishra MD    Controlled Substances Monitoring:   No flowsheet data found. OARRS report reviewed 2/15/23   CC:     No referring provider defined for this encounter.    Cathryn Erickson, KAIDEN - CNP   [id number]  [address] [facility]

## 2023-02-15 NOTE — PROGRESS NOTES
Do you currently have any of the following:    Fever: No  Headache:  No  Cough: No  Shortness of breath: No  Exposed to anyone with these symptoms: No         Ramachandran Carrier presents to the [place] [id number] on 2/15/2023. Mary Beth Arnold is complaining of pain low back. The pain is constant. The pain is described as aching, throbbing, shooting, stabbing, and dull. Pain is rated on his best day at a 5, on his worst day at a 8, and on average at a 6 on the VAS scale. He took his last dose of Lyrica this morning . Any procedures since your last visit: Yes, with 30 % relief.     Pacemaker or defibrillator: No     He is not on NSAIDS and is not on anticoagulation medications   Medication Contract and Consent for Opioid Use Documents Filed        No documents found Surgery

## 2023-02-15 NOTE — TELEPHONE ENCOUNTER
Call to Barak Kapoor that procedure was approved for 2/21/2023 and that [facility] should call him a few days before for the pre op call and between 2:00 PM and 4:00 PM  the business day before with the arrival time. Instructed Barak to hold ibuprofen for 24 hours, naprosyn and mobic for 4 days and any aspirin containing products or fish oil for 7 days. Instructed to call office back if any questions. Barak verbalized understanding.    Electronically signed by Tra Nickerson RN on 2/15/2023 at 1:39 PM

## 2023-02-16 ENCOUNTER — TREATMENT (OUTPATIENT)
Dept: PHYSICAL THERAPY | Age: 63
End: 2023-02-16

## 2023-02-16 DIAGNOSIS — M54.16 LUMBAR RADICULOPATHY: Primary | ICD-10-CM

## 2023-02-16 NOTE — PROGRESS NOTES
[facility]   Phone: [phone]   Fax: [phone]      Physical Therapy Treatment Note    Date: 2023  Patient Name: Kurtis Pino  : 1960   MRN: [id number]  [facility]: years   DOSx: years   Referring Provider: No referring provider defined for this encounter. Medical Diagnosis:   M54.16 (ICD-10-CM) - Lumbar radiculopathy    Outcome Measure:  Oswestry 66%    S: reports pain in low back is 7-8/10. Pt reports that he hasn't had any \"sharp pains\" lately. O:  Time 1115- 1153     Visit 10/12 Repeat outcome measure at mid point and end. Pain See above. ROM      Modalities      MH + ES            Exercise      ALL EXERCISE DONE WITH DRAW-IN TECHNIQUE                            Functional activities To aid in reaching , pushing, pulling tasks at home     ROWS: H  \"    ROWS: M 2 x 10   GTB    ROWS: L  \"    Lat pull down  2 x 10 GTB    Obliques  walk outs  X 10 each  10# cable system     THEREX     Bike 10 minutes      Punches      Lat pulldowns 2 x 10   GTB    Triceps ext standing      Marching with core activation  GTB    Hip adduction  20 x  Ball  te   Sit To stands  10x  No UE support, blue foam     Repeated extension  2 x 10 Into ball    Repeated extension and to R No Change in numbness/tingling in B legs    PPT  20x      Hip abd 20x  GTB     Hip EXT standing  10 x      LTR  10x B      Supine marching with core activation      Resisted gait fwd, backward 40# = 10#    Scap retraction with ER 2 x 10 gtb    Bridges  X 10      A:  Tolerated fairly well.   Pt reports pain is the same as on arrival.     P: Continue with rehab plan  Mayra Wilkins, PTA  [id number]  Treatment Charges: Mins Units   Initial Evaluation     Re-Evaluation     Ther Exercise         TE 30 2   Manual Therapy     MT     Ther Activities        TA 8 1   Gait Training          GT     Neuro Re-education NR     Modalities     Non-Billable Service Time     Other     Total Time/Units 38 3

## 2023-02-17 NOTE — PROGRESS NOTES
[facility]  INSTRUCTIONS  . .......................................................................................................................................... [x] Parking the day of Surgery is located in the [facility].   Upon entering the door, make immediate right into the surgery reception room    [x]  Bring photo ID and insurance card     [x] You may have a light breakfast day of procedure    [x]  Wear loose comfortable clothing    [x]  Please follow instructions for medications as given per Dr's office    [x] You can expect a call the business day prior to procedure to notify you of your arrival time     [x] Please arrange for     []  Other instructions

## 2023-02-20 ENCOUNTER — TREATMENT (OUTPATIENT)
Dept: PHYSICAL THERAPY | Age: 63
End: 2023-02-20
Payer: MEDICARE

## 2023-02-20 DIAGNOSIS — M54.16 LUMBAR RADICULOPATHY: Primary | ICD-10-CM

## 2023-02-20 PROCEDURE — 97110 THERAPEUTIC EXERCISES: CPT | Performed by: PHYSICAL THERAPIST

## 2023-02-20 PROCEDURE — 97530 THERAPEUTIC ACTIVITIES: CPT | Performed by: PHYSICAL THERAPIST

## 2023-02-20 NOTE — PROGRESS NOTES
[facility] [facility]   Phone: [phone]   Fax: [phone]        Referring Provider: Lul Jaquez MD  [address] [place] [address]. [place],  [facility]          Medical Diagnosis:   M54.16 (ICD-10-CM) - Lumbar radiculopathy    CERTIFICATION PERIOD:   1/12/2023  to 2/24/2023. ATTENDANCE:  Patient has attended 6 of *** scheduled treatments from 1/12/2023  to ***. TREATMENTS RECEIVED:  ***    INITIAL STATUS:  ***  ***    CURRENT STATUS:       Observations: well nourished male                   Gait: increased lateral sway, antalgic      Functional Strength:NT - toe walk, heel walk, and squat. Range of Motion:     Trunk:               Flexion:                       [] Normal   [x] Limited 20%              Extension:                   [] Normal   [x] Limited 70%               Right Rotation:            [] Normal   [x] Limited 60%              Left Rotation:               [] Normal   [x] Limited 50%              Right Side Bending:    [] Normal   [x] Limited 10%              Left Side Bending:      [] Normal   [x] Limited 10%     Lower Extremity:              Right:                           [x] Normal   [] Limited               Left:                             [x] Normal   [] Limited         Strength:                 Trunk:  decreased ROM, decreased strength              R LE:   4+/5              L LE:    4+/5     Palpation: Tender to palpation over L spine, sacrum , B SI, Non-tender to palpation T spine       Sensation: reports tingling down B LE's.        Special Tests:   [] Nerve Root Compression           Right []+ / [] -    Left []+ / [] -  [] Slump           Right []+ / [x] -    Left []+ / [x] -  [] FADIR          Right []+ / [] -    Left []+ / [] -  [] S-I Distraction          Right []+ / [] -    Left []+ / [] -      [] SLR           Right []+ / [] -    Left []+ / [] -     [] AMINTA          Right []+ / [] -    Left []+ / [] -  [] S-I Compression          Right []+ / [] -    Left []+ / [] -   [] Other: []+ / [] -                     Short Term goals (3 weeks)  Decrease reported pain to 0-7/10  Increase ROM by 10%  Increase Strength to 5-/5    Able to perform/complete the following functions/tasks: Pt able to walk 15 + minutes with min pain/limitation. Pt able to go up/down 5 steps with min pain/limitation. Pt able to stand 10 + minutes with min pain/limitation. Oswestry Low Back Disability Questionnaire 50% disability     Long Term goals (6 weeks)  Decrease reported pain to 0-4/10  Increase ROM by 20%  Increase Strength to 5/5   Able to perform/complete the following functions/tasks: Pt able to walk 30 + minutes with no to min pain/limitation. Pt able to go up/down flight of steps with no to min pain/limitation. Pt able to stand 15 + minutes with no to min pain/limitation. Oswestry Low Back Disability Questionnaire 40% disability   Independent with Home Exercise Programs    OUTCOME MEASURE:      COMMENTS AND RECOMMENDATIONS:   ***    Thank you for the opportunity to work with your patient. Hunter Garcia, PT    I CERTIFY THAT THE ABOVE REASSESSMENT AND PLAN OF CARE FOR PHYSICAL THERAPY SERVICES ARE APPROPRIATE AND MEDICALLY NECESSARY.     Duration: From 2/20/2023 thru ***    ________________________                _______________  Physician     Date

## 2023-02-20 NOTE — PROGRESS NOTES
[address] [facility]   Phone: [phone]   Fax: [phone]      Physical Therapy Treatment Note    Date: 2023  Patient Name: Divya Moura  : 1960   MRN: [id number]  [facility]: years   DOSx: years   Referring Provider: No referring provider defined for this encounter. Medical Diagnosis:   M54.16 (ICD-10-CM) - Lumbar radiculopathy    Outcome Measure:  Oswestry 66%    S: reports pain 7-8/10 in back and from knees down. Reports until the last few days has been doing pretty good but more pain the last few days; reports no change in activity level. O:  Time 1121- 1153     Visit  Repeat outcome measure at mid point and end. Pain See above. ROM      Modalities      MH + ES            Exercise      ALL EXERCISE DONE WITH DRAW-IN TECHNIQUE                            Functional activities To aid in reaching , pushing, pulling tasks at home     ROWS: H  \"    ROWS: M 2 x 10   GTB    ROWS: L  \"          Obliques  walk outs  X 10 each  10# cable system     THEREX     Bike 8  minutes      Punches      Lat pulldowns 2 x 10   GTB    Triceps ext standing      Marching with core activation  GTB    Hip adduction  20 x  Ball  te   Sit To stands  No UE support, blue foam     Repeated extension  2 x 10 Into ball    Repeated extension and to R No Change in numbness/tingling in B legs    PPT  20x      Hip abd 20x  GTB     Hip EXT standing  10 x      LTR  10x B      Supine marching with core activation      Resisted gait fwd, backward 40# = 10#    Scap retraction with ER 2 x 10 gtb    Bridges  X 10      A:  Tolerated fairly well. Pt reports pain is 'no worse' end of session. P: Continue with rehab plan.      Charito Bonner [place]  [id number]  Treatment Charges: Mins Units   Initial Evaluation     Re-Evaluation     Ther Exercise         TE 22 1   Manual Therapy     MT     Ther Activities        TA 10 1   Gait Training          GT     Neuro Re-education NR     Modalities Non-Billable Service Time     Other     Total Time/Units 32 2

## 2023-02-21 ENCOUNTER — HOSPITAL ENCOUNTER (OUTPATIENT)
Age: 63
Setting detail: OUTPATIENT SURGERY
Discharge: HOME | End: 2023-02-21
Attending: STUDENT IN AN ORGANIZED HEALTH CARE EDUCATION/TRAINING PROGRAM | Admitting: STUDENT IN AN ORGANIZED HEALTH CARE EDUCATION/TRAINING PROGRAM
Payer: MEDICARE

## 2023-02-21 ENCOUNTER — HOSPITAL ENCOUNTER (OUTPATIENT)
Dept: GENERAL RADIOLOGY | Age: 63
Setting detail: OUTPATIENT SURGERY
Discharge: HOME | End: 2023-02-23
Attending: STUDENT IN AN ORGANIZED HEALTH CARE EDUCATION/TRAINING PROGRAM
Payer: MEDICARE

## 2023-02-21 VITALS
HEART RATE: 66 BPM | SYSTOLIC BLOOD PRESSURE: 146 MMHG | DIASTOLIC BLOOD PRESSURE: 79 MMHG | RESPIRATION RATE: 16 BRPM | OXYGEN SATURATION: 96 % | TEMPERATURE: 97.7 F | WEIGHT: 210 LBS | BODY MASS INDEX: 29.4 KG/M2 | HEIGHT: 71 IN

## 2023-02-21 DIAGNOSIS — R52 PAIN MANAGEMENT: ICD-10-CM

## 2023-02-21 PROCEDURE — 2709999900 HC NON-CHARGEABLE SUPPLY: Performed by: STUDENT IN AN ORGANIZED HEALTH CARE EDUCATION/TRAINING PROGRAM

## 2023-02-21 PROCEDURE — 6360000004 HC RX CONTRAST MEDICATION: Performed by: STUDENT IN AN ORGANIZED HEALTH CARE EDUCATION/TRAINING PROGRAM

## 2023-02-21 PROCEDURE — 3209999900 FLUORO FOR SURGICAL PROCEDURES

## 2023-02-21 PROCEDURE — 7100000010 HC PHASE II RECOVERY - FIRST 15 MIN: Performed by: STUDENT IN AN ORGANIZED HEALTH CARE EDUCATION/TRAINING PROGRAM

## 2023-02-21 PROCEDURE — 6360000002 HC RX W HCPCS: Performed by: STUDENT IN AN ORGANIZED HEALTH CARE EDUCATION/TRAINING PROGRAM

## 2023-02-21 PROCEDURE — 7100000011 HC PHASE II RECOVERY - ADDTL 15 MIN: Performed by: STUDENT IN AN ORGANIZED HEALTH CARE EDUCATION/TRAINING PROGRAM

## 2023-02-21 PROCEDURE — 2500000003 HC RX 250 WO HCPCS: Performed by: STUDENT IN AN ORGANIZED HEALTH CARE EDUCATION/TRAINING PROGRAM

## 2023-02-21 PROCEDURE — 62323 NJX INTERLAMINAR LMBR/SAC: CPT | Performed by: STUDENT IN AN ORGANIZED HEALTH CARE EDUCATION/TRAINING PROGRAM

## 2023-02-21 PROCEDURE — 3600000002 HC SURGERY LEVEL 2 BASE: Performed by: STUDENT IN AN ORGANIZED HEALTH CARE EDUCATION/TRAINING PROGRAM

## 2023-02-21 RX ORDER — SODIUM CHLORIDE 9 MG/ML
INJECTION, SOLUTION INTRAVENOUS PRN
Status: DISCONTINUED | OUTPATIENT
Start: 2023-02-21 | End: 2023-02-21 | Stop reason: HOSPADM

## 2023-02-21 RX ORDER — SODIUM CHLORIDE 0.9 % (FLUSH) 0.9 %
5-40 SYRINGE (ML) INJECTION EVERY 12 HOURS SCHEDULED
Status: DISCONTINUED | OUTPATIENT
Start: 2023-02-21 | End: 2023-02-21 | Stop reason: HOSPADM

## 2023-02-21 RX ORDER — LIDOCAINE HYDROCHLORIDE 5 MG/ML
INJECTION, SOLUTION INFILTRATION; INTRAVENOUS PRN
Status: DISCONTINUED | OUTPATIENT
Start: 2023-02-21 | End: 2023-02-21 | Stop reason: ALTCHOICE

## 2023-02-21 RX ORDER — SODIUM CHLORIDE 0.9 % (FLUSH) 0.9 %
5-40 SYRINGE (ML) INJECTION PRN
Status: DISCONTINUED | OUTPATIENT
Start: 2023-02-21 | End: 2023-02-21 | Stop reason: HOSPADM

## 2023-02-21 RX ORDER — METHYLPREDNISOLONE ACETATE 40 MG/ML
INJECTION, SUSPENSION INTRA-ARTICULAR; INTRALESIONAL; INTRAMUSCULAR; SOFT TISSUE PRN
Status: DISCONTINUED | OUTPATIENT
Start: 2023-02-21 | End: 2023-02-21 | Stop reason: ALTCHOICE

## 2023-02-21 ASSESSMENT — PAIN DESCRIPTION - DESCRIPTORS
DESCRIPTORS: DISCOMFORT

## 2023-02-21 ASSESSMENT — PAIN DESCRIPTION - LOCATION
LOCATION: KNEE;LEG

## 2023-02-21 ASSESSMENT — PAIN SCALES - GENERAL
PAINLEVEL_OUTOF10: 4
PAINLEVEL_OUTOF10: 6
PAINLEVEL_OUTOF10: 6

## 2023-02-21 ASSESSMENT — PAIN DESCRIPTION - ONSET
ONSET: ON-GOING

## 2023-02-21 ASSESSMENT — PAIN DESCRIPTION - PAIN TYPE
TYPE: CHRONIC PAIN

## 2023-02-21 ASSESSMENT — PAIN DESCRIPTION - ORIENTATION
ORIENTATION: RIGHT;LEFT
ORIENTATION: LEFT;RIGHT
ORIENTATION: RIGHT;LEFT

## 2023-02-21 ASSESSMENT — PAIN DESCRIPTION - FREQUENCY
FREQUENCY: CONTINUOUS

## 2023-02-21 ASSESSMENT — PAIN - FUNCTIONAL ASSESSMENT: PAIN_FUNCTIONAL_ASSESSMENT: 0-10

## 2023-02-21 NOTE — OP NOTE
2023    Patient: Kacy Del Cid  :  1960  Age:  57 y.o. Sex:  male     PRE-OPERATIVE DIAGNOSIS: Lumbar disc displacement, lumbar radiculopathy. POST-OPERATIVE DIAGNOSIS: Same. PROCEDURE: Fluoroscopic guided therapeutic lumbar epidural steroid injection at the L4/5 level (# 2). SURGEON:  Jitendra Weaver MD    ANESTHESIA: Local    ESTIMATED BLOOD LOSS: None.  ______________________________________________________________________    BRIEF HISTORY:  Kacy Del Cid comes in today for second lumbar epidural injection at L4/5 level. The potential complications of this procedure were discussed with him again today. He has elected to undergo the aforementioned procedure. Melissa complete History & Physical examination were reviewed in depth, a copy of which is in the chart. DESCRIPTION OF PROCEDURE:    After confirming written and informed consent, a time-out was performed and Melissa name and date of birth, the procedure to be performed as well as the plan for the location of the needle insertion were confirmed. The patient was brought into the procedure room and placed in the prone position on the fluoroscopy table. A pillow was placed under the patient's lower abdomen/upper pelvis to increase lumbar interlaminar space. Standard monitors were placed, and vital signs were observed throughout the procedure. The area of the lumbar spine was prepped with chloraprep and draped in a sterile manner. The L4/5 interspace was identified and marked under AP fluoroscopy. The skin and subcutaneous tissues at the above level were anesthestized with 0.5% lidocaine. With intermittent fluoroscopy, an # 18 gauge 3 1/2 tuohy epidural needle was inserted and directed toward the interlaminar space. The needle was slowly advanced using loss of resistance technique and 5 cc glass syringe  until the tip of the epidural needle has passed through the ligamentum flavum and entered the epidural space.  AP and lateral fluoroscopic imaging is performed to verify that the epidural needle is properly placed. Negative aspiration of blood and CSF was confirmed. 1 ml of Isovue - M 300  was used for confirmation of even epidural spread under both live and AP fluoroscopy. After negative aspiration, a solution of 3 mL of 0.5 % Lidocaine and 40 mg DepoMedrol was easily injected. The needle was gently removed intact . The patient back was cleaned and a Band-Aid was placed over the needle insertion point. Disposition the patient tolerated the procedure well and there were no complications . Vital signs remained stable throughout the procedure. The patient was escorted to the recovery area where they remained until discharge and written discharge instructions for the procedure were given. Plan: Joyce Ramos will return to our pain medicine center as scheduled.      Chantel Wilkinson MD

## 2023-02-21 NOTE — H&P
[address]  Pain Medicine  [address]    Procedure History & Physical      Critical access hospital     HPI:    Patient  is here for LBP, LLE Pain for L4/5 GERMAIN - Left paramedian  Labs/imaging studies reviewed   All question and concerns addressed including R/B/A associated with the procedure    Past Medical History:   Diagnosis Date    GERD (gastroesophageal reflux disease)     Hyperlipidemia     Hypertension     Low back pain        Past Surgical History:   Procedure Laterality Date    BACK SURGERY      PAIN MANAGEMENT PROCEDURE Right 1/24/2023    LUMBAR EPIDURAL STEROID INJECTION UNDER FLUOROSCOPIC GUIDANCE AT L4-L5 RIGHT PARAMEDIAN performed by Ronda Aguayo MD at [facility] OR       Prior to Admission medications    Medication Sig Start Date End Date Taking? Authorizing Provider   amLODIPine (NORVASC) 5 MG tablet Take 5 mg by mouth daily    Historical Provider, MD   omeprazole (PRILOSEC) 20 MG delayed release capsule Take 20 mg by mouth daily    Historical Provider, MD   cyanocobalamin 1000 MCG tablet Take 1,000 mcg by mouth daily    Historical Provider, MD   pregabalin (LYRICA) 150 MG capsule Take 150 mg by mouth in the morning, at noon, and at bedtime.     Historical Provider, MD   finasteride (PROSCAR) 5 MG tablet Take 5 mg by mouth daily    Historical Provider, MD   tamsulosin (FLOMAX) 0.4 MG capsule Take 0.4 mg by mouth daily    Historical Provider, MD   levothyroxine (SYNTHROID) 50 MCG tablet Take 50 mcg by mouth Daily    Historical Provider, MD   rosuvastatin (CRESTOR) 20 MG tablet Take 20 mg by mouth daily    Historical Provider, MD   Cholecalciferol 50 MCG (2000 UT) TABS Take by mouth daily    Historical Provider, MD   Magnesium Oxide 420 MG TABS Take by mouth    Historical Provider, MD   cyclobenzaprine (FLEXERIL) 10 MG tablet Take 10 mg by mouth 3 times daily as needed for Muscle spasms    Historical Provider, MD   meloxicam (MOBIC) 15 MG tablet Take 1 tablet by mouth daily as needed for Pain 12/14/22   TAMMY Grossman       No Known Allergies    Social History     Socioeconomic History    Marital status:      Spouse name: Not on file    Number of children: Not on file    Years of education: Not on file    Highest education level: Not on file   Occupational History    Not on file   Tobacco Use    Smoking status: Former     Packs/day: 1.00     Years: 2.00     Pack years: 2.00     Types: Cigarettes     Start date: 2020    Smokeless tobacco: Never   Vaping Use    Vaping Use: Never used   Substance and Sexual Activity    Alcohol use: Yes     Comment: occasional    Drug use: Not Currently    Sexual activity: Not on file   Other Topics Concern    Not on file   Social History Narrative    Not on file     Social Determinants of Health     Financial Resource Strain: Not on file   Food Insecurity: Not on file   Transportation Needs: Not on file   Physical Activity: Not on file   Stress: Not on file   Social Connections: Not on file   Intimate Partner Violence: Not on file   Housing Stability: Not on file       Family History   Problem Relation Age of Onset    Diabetes Mother     Cancer Father          REVIEW OF SYSTEMS:    CONSTITUTIONAL:  negative for  fevers, chills, sweats and fatigue    RESPIRATORY:  negative for  dry cough, cough with sputum, dyspnea, wheezing and chest pain    CARDIOVASCULAR:  negative for chest pain, dyspnea, palpitations, syncope    GASTROINTESTINAL:  negative for nausea, vomiting, change in bowel habits, diarrhea, constipation and abdominal pain    MUSCULOSKELETAL: negative for muscle weakness    SKIN: negative for itching or rashes.     BEHAVIOR/PSYCH:  negative for poor appetite, increased appetite, decreased sleep and poor concentration    All other systems negative      PHYSICAL EXAM:    VITALS:  BP (!) 160/88   Pulse 71   Resp 16   Ht 5' 11\" (1.803 m)   Wt 210 lb (95.3 kg)   SpO2 96%   BMI 29.29 kg/m²     CONSTITUTIONAL: awake, alert, cooperative, no apparent distress, and appears stated age    EYES: PERRLA, EOMI    LUNGS:  No increased work of breathing, no audible wheezing    CARDIOVASCULAR:  regular rate and rhythm    ABDOMEN:  Soft non tender non distended     EXTREMITIES: no signs of clubbing or cyanosis. MUSCULOSKELETAL: negative for flaccid muscle tone or spastic movements. SKIN: gross examination reveals no signs of rashes, or diaphoresis. NEURO: Cranial nerves II-XII grossly intact. No signs of agitated mood.        Assessment/Plan:     LBP, LLE Pain for L4/5 GERMAIN - Left paramedian

## 2023-02-21 NOTE — DISCHARGE INSTRUCTIONS
Rasheeda Osman/[employer]  Home Going Instructions    1-Go home, rest for the remainder of the day  2-Please do not lift over 20 pounds the day of the injection  3-If you received sedation No: alcohol, driving, operating lawn mowers, plows, tractors or other dangerous equipment until next morning. Do not make important decisions or sign legal documents for 24 hours. You may experience light headedness, dizziness, nausea or sleepiness after sedation. Do not stay alone. A responsible adult must be with you for 24 hours. You could be nauseated from the medications you have received. Your IV site may be sore and bruised. 4-No dietary restrictions     5-Resume all medications the same day, blood thinners to be resumed 24 hours after injection if you were instructed to stop any. 6-Keep the surgical site clean and dry, you may shower the next morning and remove the      dressing. 7- No sitz baths, tub baths or hot tubs/swimming for 24 hours. 8- If you have any pain at the injection site(s), application of an ice pack to the area should be       helpful, 20 minutes on/20 minutes off for next 48 hours. 9- Call [facility] at if you develop.   Fever greater than 100.4 F  Have bleeding or drainage from the puncture site  Have progressive Leg/arm numbness and or weakness  Loss of control of bowel and or bladder (wet/soil yourself)  Severe headache with inability to lift head  10-You may return to work the next day

## 2023-02-23 ENCOUNTER — TREATMENT (OUTPATIENT)
Dept: PHYSICAL THERAPY | Age: 63
End: 2023-02-23

## 2023-02-23 DIAGNOSIS — M54.16 LUMBAR RADICULOPATHY: Primary | ICD-10-CM

## 2023-02-23 NOTE — PROGRESS NOTES
[address] [facility]   Phone: [phone]   Fax: [phone]      Physical Therapy Treatment Note    Date: 2023  Patient Name: Donal Irizarry  : 1960   MRN: [id number]  DOInjury: years   DOSx: years   Referring Provider: No referring provider defined for this encounter. Medical Diagnosis:   M54.16 (ICD-10-CM) - Lumbar radiculopathy    Outcome Measure:  Oswestry 72%    S: reports pain -8/10 today. Reports got injection day before yesterday and reports pain worse now instead of better. O:  Time 9468-9518     Visit  Repeat outcome measure at mid point and end. Pain See above. ROM      Modalities      MH + ES            Exercise      ALL EXERCISE DONE WITH DRAW-IN TECHNIQUE                            Functional activities To aid in reaching , pushing, pulling tasks at home     ROWS: H  \"    ROWS: M GTB    ROWS: L \"         Obliques  walk outs  10# cable system         Bike     Punches     Lat pulldowns GTB    Triceps ext standing     Marching with core activation  GTB    Hip adduction  Ball  te   Sit To stands  No UE support, blue foam     Repeated extension  Into ball    Repeated extension and to R No Change in numbness/tingling in B legs    PPT      Hip abd GTB     Hip EXT standing      LTR      Supine marching with core activation      Resisted gait fwd, backward 40# = 10#    Scap retraction with ER gtb    Bridges      A:  Tolerated fair. Reassessment completed today. See note for details. Due to lack of progress in therapy will discharge pt at this time. Recommend pt refer back to referring physician. Discussed with pt, recommend pt call referring physician for followup. Pt demonstrates understanding. P:Will discharge pt at this time.       [place], [place]  [id number]  Treatment Charges: Mins Units   Initial Evaluation     Re-Evaluation 19 1   Ther Exercise         TE     Manual Therapy     MT     Ther Activities        TA     Gait Training GT     Neuro Re-education NR     Modalities     Non-Billable Service Time     Other     Total Time/Units 19 1

## 2023-02-23 NOTE — PROGRESS NOTES
[address] [facility]   Phone: [phone]   Fax: [phone]        Referring Provider: Mae Collado MD  [address] [place] [address]. [facility],  [facility]             Medical Diagnosis:   M54.16 (ICD-10-CM) - Lumbar radiculopathy      CERTIFICATION PERIOD: 1/12/2023  to 2/24/2023. ATTENDANCE:  Patient has attended 15 of 12 scheduled treatments from 1/12/2023  to 2/23/2023 . TREATMENTS RECEIVED:  therapeutic exercise, therapeutic activity     INITIAL STATUS:    Observations: well nourished male                   Gait: increased lateral sway, antalgic      Functional Strength:NT - toe walk, heel walk, and squat. Range of Motion:     Trunk:               Flexion:                       [] Normal   [x] Limited 20%              Extension:                   [] Normal   [x] Limited 70%               Right Rotation:            [] Normal   [x] Limited 60%              Left Rotation:               [] Normal   [x] Limited 50%              Right Side Bending:    [] Normal   [x] Limited 10%              Left Side Bending:      [] Normal   [x] Limited 10%     Lower Extremity:              Right:                           [x] Normal   [] Limited               Left:                             [x] Normal   [] Limited         Strength:                 Trunk:  decreased ROM, decreased strength              R LE:   4+/5              L LE:    4+/5     Palpation: Tender to palpation over L spine, sacrum , B SI, Non-tender to palpation T spine       Sensation: reports tingling down B LE's.        Special Tests:   [] Nerve Root Compression           Right []+ / [] -    Left []+ / [] -  [] Slump           Right []+ / [x] -    Left []+ / [x] -  [] FADIR          Right []+ / [] -    Left []+ / [] -  [] S-I Distraction          Right []+ / [] -    Left []+ / [] -      [] SLR           Right []+ / [] -    Left []+ / [] -     [] AMINTA          Right []+ / [] -    Left []+ / [] -  [] S-I Compression          Right []+ / [] -    Left []+ / [] -   [] Other: []+ / [] -                  CURRENT STATUS:    Observations: well nourished male                   Gait: increased lateral sway, antalgic      Functional Strength:NT - toe walk, heel walk, and squat. Range of Motion:     Trunk:               Flexion:                       [] Normal   [x] Limited 20%              Extension:                   [] Normal   [x] Limited 70%               Right Rotation:            [] Normal   [x] Limited 50%              Left Rotation:               [] Normal   [x] Limited 50%              Right Side Bending:    [] Normal   [x] Limited 10%              Left Side Bending:      [] Normal   [x] Limited 10%     Lower Extremity:              Right:                           [x] Normal   [] Limited               Left:                             [x] Normal   [] Limited         Strength:                 Trunk:  decreased ROM, decreased strength              R LE:   4 - to 4/5                L LE:    4- to 4/5     Palpation: Tender to palpation over lower T spine, L spine, R SI      Sensation: reports tingling down B LE's. Special Tests:   [] Nerve Root Compression           Right []+ / [] -    Left []+ / [] -  [] Slump           Right [x]+ / [] -    Left []+ / [x] -  [] FADIR          Right []+ / [] -    Left []+ / [] -  [] S-I Distraction          Right []+ / [] -    Left []+ / [] -      [] SLR           Right []+ / [] -    Left []+ / [] -     [] AMINTA          Right []+ / [] -    Left []+ / [] -  [] S-I Compression          Right []+ / [] -    Left []+ / [] -   [] Other: []+ / [] -                 Short Term goals (3 weeks)  Decrease reported pain to 0-7/10 (not met)   Increase ROM by 10% (not met)   Increase Strength to 5-/5   (not met)   Able to perform/complete the following functions/tasks: Pt able to walk 15 + minutes with min pain/limitation. Pt able to go up/down 5 steps with min pain/limitation.   Pt able to stand 10 + minutes with min pain/limitation.  (not met)   Oswestry Low Back Disability Questionnaire 50% disability (not met)      Long Term goals (6 weeks)  Decrease reported pain to 0-4/10 (not met)   Increase ROM by 20% (not met)   Increase Strength to 5/5  (not met)   Able to perform/complete the following functions/tasks: Pt able to walk 30 + minutes with no to min pain/limitation.  Pt able to go up/down flight of steps with no to min pain/limitation.  Pt able to stand 15 + minutes with no to min pain/limitation.  (not met)   Oswestry Low Back Disability Questionnaire 40% disability (not met)   Independent with Home Exercise Programs    OUTCOME MEASURE:  Oswestry 72%     COMMENTS AND RECOMMENDATIONS:   Pt has made poor progress in therapy.  Pt reports pain 7-8/10 today.  Pt reports got injection day before yesterday and pain is worse now instead of better.  Pt reports can stand about 5 minutes before needing to sit down.  Pt reports only walking household distances currently.  Pt reports has 6-7 steps at home; reports going up/down steps is painful.  Pt reports today doesn't feel better than when first started therapy.  Will discharge pt at this time due to lack of progress and refer back to referring physician.  Discussed with pt recommend pt call referring physician for followup.  Pt demonstrates understanding.  Recommend pt call with any questions.  Will discharge pt at this time.        Thank you for the opportunity to work with your patient.     Aranza Osman, PT DPT [id number]    I CERTIFY THAT THE ABOVE REASSESSMENT AND PLAN OF CARE FOR PHYSICAL THERAPY SERVICES ARE APPROPRIATE AND MEDICALLY NECESSARY.      ________________________                _______________  Physician     Date

## 2023-02-27 ENCOUNTER — TELEPHONE (OUTPATIENT)
Dept: PAIN MANAGEMENT | Age: 63
End: 2023-02-27

## 2023-02-27 NOTE — TELEPHONE ENCOUNTER
Reviewed with Dr. Marito Newman. Left message that he does not prescribe pain medications over the phone. Advised to keep neurosurgery appointment for tomorrow.

## 2023-02-27 NOTE — TELEPHONE ENCOUNTER
Wife called stating that his pain is worse now than before. States it was better for a few days after the procedure,  ut then is now worse. He has an appointment with neurosurgery tomorrow. She states he only has Tylenol for pain and states he needs something stronger.

## 2023-02-28 ENCOUNTER — OFFICE VISIT (OUTPATIENT)
Dept: NEUROSURGERY | Age: 63
End: 2023-02-28
Payer: MEDICARE

## 2023-02-28 VITALS
HEART RATE: 74 BPM | SYSTOLIC BLOOD PRESSURE: 135 MMHG | TEMPERATURE: 97.4 F | BODY MASS INDEX: 29.4 KG/M2 | DIASTOLIC BLOOD PRESSURE: 84 MMHG | OXYGEN SATURATION: 97 % | WEIGHT: 210 LBS | HEIGHT: 71 IN | RESPIRATION RATE: 18 BRPM

## 2023-02-28 DIAGNOSIS — M54.16 LUMBAR RADICULOPATHY: Primary | ICD-10-CM

## 2023-02-28 PROCEDURE — 99212 OFFICE O/P EST SF 10 MIN: CPT

## 2023-02-28 PROCEDURE — G8427 DOCREV CUR MEDS BY ELIG CLIN: HCPCS | Performed by: NEUROLOGICAL SURGERY

## 2023-02-28 PROCEDURE — 99215 OFFICE O/P EST HI 40 MIN: CPT | Performed by: NEUROLOGICAL SURGERY

## 2023-02-28 PROCEDURE — G8484 FLU IMMUNIZE NO ADMIN: HCPCS | Performed by: NEUROLOGICAL SURGERY

## 2023-02-28 PROCEDURE — 4004F PT TOBACCO SCREEN RCVD TLK: CPT | Performed by: NEUROLOGICAL SURGERY

## 2023-02-28 PROCEDURE — G8419 CALC BMI OUT NRM PARAM NOF/U: HCPCS | Performed by: NEUROLOGICAL SURGERY

## 2023-02-28 PROCEDURE — 3017F COLORECTAL CA SCREEN DOC REV: CPT | Performed by: NEUROLOGICAL SURGERY

## 2023-02-28 RX ORDER — METHYLPREDNISOLONE 4 MG/1
TABLET ORAL
Qty: 1 KIT | Refills: 0 | Status: SHIPPED | OUTPATIENT
Start: 2023-02-28 | End: 2023-03-06

## 2023-02-28 NOTE — PROGRESS NOTES
[facility] NEUROSURGERY  [facility] [address] [phone]       Chief Complaint:   Chief Complaint   Patient presents with    Back Pain     Here for severe back pain  had surgery 18 years ago last therapy was at [facility] in [place]  injections done last Tuesday at [facility]        HPI:     I had the pleasure of seeing Beto Javier today in neurosurgical clinic. As you know this 75-year-old right-handed  father of 3 presented to us with right lower extremity radiculopathy that extended from his buttocks down the lateral aspect of his leg to the top of his foot. Today he presents after trial of pain management. He states that his initial epidural steroid seem to help however his second trial of this yielded no relief. He denies any loss of bowel or bladder function there is no new weakness. He states that he has numbness in his right toes.   States that he is having a difficult time participating in physical therapy    Past Medical History:   Diagnosis Date    GERD (gastroesophageal reflux disease)     Hyperlipidemia     Hypertension     Low back pain      Past Surgical History:   Procedure Laterality Date    BACK SURGERY      PAIN MANAGEMENT PROCEDURE Right 1/24/2023    LUMBAR EPIDURAL STEROID INJECTION UNDER FLUOROSCOPIC GUIDANCE AT L4-L5 RIGHT PARAMEDIAN performed by Luis Valencia MD at [facility] OR    PAIN MANAGEMENT PROCEDURE Left 2/21/2023    LUMBAR EPIDURAL STEROID INJECTION UNDER FLUOROSCOPIC GUIDANCE AT L4-L5 left PARAMEDIAN performed by Luis Valencia MD at [facility] OR      Family History   Problem Relation Age of Onset    Diabetes Mother     Cancer Father       Social History     Socioeconomic History    Marital status:      Spouse name: Not on file    Number of children: Not on file    Years of education: Not on file    Highest education level: Not on file   Occupational History    Not on file Tobacco Use    Smoking status: Every Day     Packs/day: 1.00     Years: 2.00     Pack years: 2.00     Types: Cigarettes     Start date: 2020    Smokeless tobacco: Never   Vaping Use    Vaping Use: Never used   Substance and Sexual Activity    Alcohol use: Yes     Comment: occasional    Drug use: Not Currently    Sexual activity: Not on file   Other Topics Concern    Not on file   Social History Narrative    Not on file     Social Determinants of Health     Financial Resource Strain: Not on file   Food Insecurity: Not on file   Transportation Needs: Not on file   Physical Activity: Not on file   Stress: Not on file   Social Connections: Not on file   Intimate Partner Violence: Not on file   Housing Stability: Not on file       Medications:   Current Outpatient Medications   Medication Sig Dispense Refill    methylPREDNISolone (MEDROL, WES,) 4 MG tablet Take by mouth. 1 kit 0    amLODIPine (NORVASC) 5 MG tablet Take 5 mg by mouth daily      omeprazole (PRILOSEC) 20 MG delayed release capsule Take 20 mg by mouth daily      cyanocobalamin 1000 MCG tablet Take 1,000 mcg by mouth daily      pregabalin (LYRICA) 150 MG capsule Take 150 mg by mouth in the morning, at noon, and at bedtime. finasteride (PROSCAR) 5 MG tablet Take 5 mg by mouth daily      tamsulosin (FLOMAX) 0.4 MG capsule Take 0.4 mg by mouth daily      levothyroxine (SYNTHROID) 50 MCG tablet Take 50 mcg by mouth Daily      rosuvastatin (CRESTOR) 20 MG tablet Take 20 mg by mouth daily      Cholecalciferol 50 MCG (2000 UT) TABS Take by mouth daily      Magnesium Oxide 420 MG TABS Take by mouth      cyclobenzaprine (FLEXERIL) 10 MG tablet Take 10 mg by mouth 3 times daily as needed for Muscle spasms      meloxicam (MOBIC) 15 MG tablet Take 1 tablet by mouth daily as needed for Pain 30 tablet 0     No current facility-administered medications for this visit. Allergies:    Patient has no known allergies.        Review of Systems:    Denies any chest pain, headache, dyspnea, recent weight loss, fevers, chills or night sweats. Physical Examination:    /84   Pulse 74   Temp 97.4 °F (36.3 °C)   Resp 18   Ht 5' 11\" (1.803 m)   Wt 210 lb (95.3 kg)   SpO2 97%   BMI 29.29 kg/m²      On focused neurological examination, he  is awake alert oriented and rationally conversant. Speech is clear and fluent. Pupils are equal and reactive to light bilaterally, extraocular movements are intact, visual fields are full to confrontation. His  face is symmetric and grossly intact to fine touch. Uvula and tongue are both midline. Shoulder shrug is symmetric and strong. Motor examination reveals preserved power in the upper and lower extremities at 5 out of 5 throughout. Reflexes are symmetric and brisk. Plantar responses are downgoing. There is no clonus. She has numbness along the lateral and anterior aspect of his right calf. Right leg raise is positive at 45 degrees on the right. ASSESSMENT:    I personally reviewed Carina Kapoor's radiographic images, particularly the MRI of his spine dated 21 December 2022 which demonstrates large right paracentral disc extrusion at L4-5 with cranial migration. Flexion-extension x-rays were benign. 1. Lumbar radiculopathy    MEDICAL DECISION MAKING & PLAN:    Been his failure of conservative treatment modalities Mr. Shahbaz Martinez is eager to pursue surgical intervention to help alleviate his pain. The risk benefits and alternatives of lumbar laminectomy for decompression and discectomy were explained at length to both he and his wife which include but are not limited to risk of infection, bleeding, CSF leak, failure to offer benefit, need for reoperation, paralysis, paresthesia, injury to bowel bladder or sexual function, coma, cardiovascular collapse and even death. He articulated his understanding wishes to proceed.   He will need to obtain medical clearance and we will schedule his case expeditiously. Thank you so much for allowing us to participate in the care of this patient. Return for Surgery. Electronically signed by Jackie Pearl MD on 2/28/2023 at 11:26 AM       NOTE: This report was transcribed using voice recognition software.  Every effort was made to ensure accuracy; however, inadvertent computerized transcription errors may be present

## 2023-03-02 ENCOUNTER — TELEPHONE (OUTPATIENT)
Dept: NEUROSURGERY | Age: 63
End: 2023-03-02

## 2023-03-02 ENCOUNTER — PREP FOR PROCEDURE (OUTPATIENT)
Dept: NEUROSURGERY | Age: 63
End: 2023-03-02

## 2023-03-02 DIAGNOSIS — Z01.818 PRE-OP TESTING: Primary | ICD-10-CM

## 2023-03-02 RX ORDER — SODIUM CHLORIDE 9 MG/ML
INJECTION, SOLUTION INTRAVENOUS PRN
Status: CANCELLED | OUTPATIENT
Start: 2023-03-02

## 2023-03-02 RX ORDER — SODIUM CHLORIDE 0.9 % (FLUSH) 0.9 %
5-40 SYRINGE (ML) INJECTION EVERY 12 HOURS SCHEDULED
Status: CANCELLED | OUTPATIENT
Start: 2023-03-02

## 2023-03-02 RX ORDER — SODIUM CHLORIDE 0.9 % (FLUSH) 0.9 %
5-40 SYRINGE (ML) INJECTION PRN
Status: CANCELLED | OUTPATIENT
Start: 2023-03-02

## 2023-03-02 RX ORDER — SODIUM CHLORIDE 9 MG/ML
INJECTION, SOLUTION INTRAVENOUS CONTINUOUS
Status: CANCELLED | OUTPATIENT
Start: 2023-03-02

## 2023-03-08 ENCOUNTER — OFFICE VISIT (OUTPATIENT)
Dept: PAIN MANAGEMENT | Age: 63
End: 2023-03-08
Payer: MEDICARE

## 2023-03-08 VITALS
HEIGHT: 71 IN | OXYGEN SATURATION: 97 % | BODY MASS INDEX: 29.4 KG/M2 | RESPIRATION RATE: 16 BRPM | DIASTOLIC BLOOD PRESSURE: 78 MMHG | HEART RATE: 74 BPM | TEMPERATURE: 97.3 F | SYSTOLIC BLOOD PRESSURE: 110 MMHG | WEIGHT: 210 LBS

## 2023-03-08 DIAGNOSIS — M25.562 CHRONIC PAIN OF LEFT KNEE: ICD-10-CM

## 2023-03-08 DIAGNOSIS — M54.16 LUMBAR RADICULOPATHY: Primary | ICD-10-CM

## 2023-03-08 DIAGNOSIS — G62.9 PERIPHERAL POLYNEUROPATHY: ICD-10-CM

## 2023-03-08 DIAGNOSIS — M54.41 CHRONIC RIGHT-SIDED LOW BACK PAIN WITH RIGHT-SIDED SCIATICA: ICD-10-CM

## 2023-03-08 DIAGNOSIS — G89.29 CHRONIC PAIN OF LEFT KNEE: ICD-10-CM

## 2023-03-08 DIAGNOSIS — M47.816 LUMBAR SPONDYLOSIS: ICD-10-CM

## 2023-03-08 DIAGNOSIS — G89.29 CHRONIC RIGHT-SIDED LOW BACK PAIN WITH RIGHT-SIDED SCIATICA: ICD-10-CM

## 2023-03-08 PROCEDURE — 99213 OFFICE O/P EST LOW 20 MIN: CPT | Performed by: STUDENT IN AN ORGANIZED HEALTH CARE EDUCATION/TRAINING PROGRAM

## 2023-03-08 PROCEDURE — 3017F COLORECTAL CA SCREEN DOC REV: CPT | Performed by: STUDENT IN AN ORGANIZED HEALTH CARE EDUCATION/TRAINING PROGRAM

## 2023-03-08 PROCEDURE — 4004F PT TOBACCO SCREEN RCVD TLK: CPT | Performed by: STUDENT IN AN ORGANIZED HEALTH CARE EDUCATION/TRAINING PROGRAM

## 2023-03-08 PROCEDURE — G8484 FLU IMMUNIZE NO ADMIN: HCPCS | Performed by: STUDENT IN AN ORGANIZED HEALTH CARE EDUCATION/TRAINING PROGRAM

## 2023-03-08 PROCEDURE — G8427 DOCREV CUR MEDS BY ELIG CLIN: HCPCS | Performed by: STUDENT IN AN ORGANIZED HEALTH CARE EDUCATION/TRAINING PROGRAM

## 2023-03-08 PROCEDURE — G8419 CALC BMI OUT NRM PARAM NOF/U: HCPCS | Performed by: STUDENT IN AN ORGANIZED HEALTH CARE EDUCATION/TRAINING PROGRAM

## 2023-03-08 RX ORDER — MELOXICAM 15 MG/1
15 TABLET ORAL DAILY PRN
Qty: 30 TABLET | Refills: 0 | Status: SHIPPED | OUTPATIENT
Start: 2023-03-08

## 2023-03-08 NOTE — PROGRESS NOTES
[address] Pain Medicine  90 [facility], [address] [place] [address]    Pain Medicine Follow Up Note      Cassandra Carvajal     Date of Visit:  3/8/2023    CC:  Patient presents for follow up   Chief Complaint   Patient presents with    Follow-up        LUMBAR EPIDURAL STEROID INJECTION UNDER FLUOROSCOPIC GUIDANCE AT L4-L5 left PARAMEDIAN             HPI:    Pain is unchanged. Medication side effects:none. Recent interventional procedures:L4/5 GERMAIN. poor. Blood Thinners/Anticoagulation:  no  Herbal Supplements: no  Pertinent Allergies: no  Diabetic: no    Previous Plan:  L4/5 GERMAIN - complete  XR Left knee - complete  Lyrica - taking without side effects  PT - completed / discharged - minimal relief     Interval Changes:  No major change in pain  Went to see Dr. Alexey Mcqueen - re surgery  Scheduled for L4 L5 bilateral laminectomy for discectomy, 3/27/23    Procedures: yes    1/24/2023 - L4/5 GERMAIN  - 40% relief (mostly on right side, Left side now painful)  2/21/23 - L4/5 GERMAIN - no relief        Imaging: New: no     XRAY: Left Knee - 2/15/23       FINDINGS:   Three views of the left knee reveal peaking of the intercondylar tibial spine   with narrowing of the medial compartment. Spurring of the patella with large   joint effusion identified. Impression   Large joint effusion identified with mild-to-moderate degenerative changes   seen within the knee. MRI:  MRI LUMBAR SPINE WO CONTRAST 12/21/2022    Narrative  EXAMINATION:  MRI OF THE LUMBAR SPINE WITHOUT CONTRAST, 12/21/2022 8:43 am    TECHNIQUE:  Multiplanar multisequence MRI of the lumbar spine was performed without the  administration of intravenous contrast.    COMPARISON:  Lumbar spine radiographs on 12/21/2022. HISTORY:  ORDERING SYSTEM PROVIDED HISTORY: Chronic right-sided low back pain with  right-sided sciatica    FINDINGS:  BONES/ALIGNMENT: There is straightening of the lumbar spine. There is no  significant alexa or retrolisthesis.   There is an L3 hemangioma. Endplate  degenerative changes are most pronounced at L5-S1. No marrow edema to suggest an acute fracture. SPINAL CORD: The conus tip terminates near the level of the L1 vertebral  body. The cauda equina nerve roots are otherwise unremarkable. SOFT TISSUES: No paraspinal mass identified. L1-L2: There is no significant disc herniation, spinal canal stenosis or  neural foraminal narrowing. L2-L3: There is no significant disc herniation, spinal canal stenosis or  neural foraminal narrowing. L3-L4: There is a disc bulge lateralizing to the right. There is a posterior  midline annular tear in the disc. There is mild central spinal canal  narrowing primarily related to a right paracentral/foraminal disc extrusion  at L4-L5 with cephalad migration of disc. This displaces the right L4 nerve  root in the lateral recess. Minimal bilateral foraminal narrowing, left side  greater than right. L4-L5: There is a right paracentral/foraminal disc extrusion, with cephalad  migration of disc measuring 6 mm in AP diameter that extends superiorly up to  the level of the L3-L4 disc space with displacement of the right L4 nerve  root in the lateral recess. Moderate central spinal canal narrowing. There  is contact of the right L5 nerve root in the lateral recess with mild  compression. There is facet arthropathy and ligamentum flavum thickening. Severe right and moderate left foraminal narrowing. L5-S1: There is a disc bulge-osteophyte complex lateralizing to the far  right. Bilateral facet arthropathy. Mild central spinal canal narrowing. Moderate-severe right and severe left foraminal narrowing. There is  compression of the exiting left L5 nerve root. Impression  1. Large right paracentral/foraminal disc extrusion at L4-L5 with cephalad  migration of disc, contacting the exiting right L4 and L5 nerve roots.   2. Moderate central spinal canal narrowing at L4-L5 and mild central spinal  canal narrowing at L3-L4 and L5-S1.  3. Multilevel foraminal narrowing, as above, greatest on the right at L4-L5  and bilaterally at L5-S1. CT:  No results found for this or any previous visit from the past 3650 days. EMG:    Pertinent Labs:   No results found for: BUN, CREATININE, GLUCOSE, AST, ALT, LABA1C, INR, PTT, PLT, MRSAC    Potential Aberrant Drug-Related Behavior:  no     Urine Drug Screening: no   No results found for: Emil Jarett, LABBENZ, CANSU, COCAIMETSCRU, OPIATESCREENURINE, PHENCYCLIDINESCREENURINE, LABMETH, OXYCODONEUR, FENTSCRUR, DSCOMMENT    Past Medical History:   Diagnosis Date    GERD (gastroesophageal reflux disease)     Hyperlipidemia     Hypertension     Low back pain        Past Surgical History:   Procedure Laterality Date    BACK SURGERY      PAIN MANAGEMENT PROCEDURE Right 1/24/2023    LUMBAR EPIDURAL STEROID INJECTION UNDER FLUOROSCOPIC GUIDANCE AT L4-L5 RIGHT PARAMEDIAN performed by Veronica Wagner MD at [facility] OR    PAIN MANAGEMENT PROCEDURE Left 2/21/2023    LUMBAR EPIDURAL STEROID INJECTION UNDER FLUOROSCOPIC GUIDANCE AT L4-L5 left PARAMEDIAN performed by Veronica Wagner MD at [facility] OR       Prior to Admission medications    Medication Sig Start Date End Date Taking? Authorizing Provider   meloxicam (MOBIC) 15 MG tablet Take 1 tablet by mouth daily as needed for Pain 3/8/23  Yes Veronica Wagner MD   amLODIPine (NORVASC) 5 MG tablet Take 5 mg by mouth daily   Yes Historical Provider, MD   omeprazole (PRILOSEC) 20 MG delayed release capsule Take 20 mg by mouth daily   Yes Historical Provider, MD   cyanocobalamin 1000 MCG tablet Take 1,000 mcg by mouth daily   Yes Historical Provider, MD   pregabalin (LYRICA) 150 MG capsule Take 150 mg by mouth in the morning, at noon, and at bedtime.    Yes Historical Provider, MD   finasteride (PROSCAR) 5 MG tablet Take 5 mg by mouth daily   Yes Historical Provider, MD   tamsulosin (FLOMAX) 0.4 MG capsule Take 0.4 mg by mouth daily   Yes Historical Provider, MD   levothyroxine (SYNTHROID) 50 MCG tablet Take 50 mcg by mouth Daily   Yes Historical Provider, MD   rosuvastatin (CRESTOR) 20 MG tablet Take 20 mg by mouth daily   Yes Historical Provider, MD   Cholecalciferol 50 MCG (2000 UT) TABS Take by mouth daily   Yes Historical Provider, MD   Magnesium Oxide 420 MG TABS Take by mouth   Yes Historical Provider, MD   cyclobenzaprine (FLEXERIL) 10 MG tablet Take 10 mg by mouth 3 times daily as needed for Muscle spasms   Yes Historical Provider, MD       No Known Allergies    Social History     Tobacco Use    Smoking status: Every Day     Packs/day: 1.00     Years: 2.00     Pack years: 2.00     Types: Cigarettes     Start date: 2020    Smokeless tobacco: Never   Vaping Use    Vaping Use: Never used   Substance Use Topics    Alcohol use: Yes     Comment: occasional    Drug use: Not Currently       family history includes Cancer in his father; Diabetes in his mother. REVIEW OF SYSTEMS:     Germain Whitehead denies fever/chills, chest pain, shortness of breath, new bowel or bladder complaints. All other review of systems was negative except as noted above. PHYSICAL EXAMINATION:      /78   Pulse 74   Temp 97.3 °F (36.3 °C) (Temporal)   Resp 16   Ht 5' 11\" (1.803 m)   Wt 210 lb (95.3 kg)   SpO2 97%   BMI 29.29 kg/m²     General:  Pleasant in no distress and A & O x 3, Build:Normal Weight, Function: Rises from seated position easily      HEENT:normocephalic, atraumatic, Pupils regular, round, equal Sclera: icterus absent      Lungs: Breathing:normal breath pattern, unlabored     CVS: RRR, pulses intact b/l     Abdomen: non-distended and normal Non tender, no guarding. Cervical spine: Inspection: normal   Palpation: No tenderness over the midline and paraspinal area, bilaterally   Range of motion: Normal flexion, extension, rotation bilaterally and is not painful.   Spurling's: negative bilaterally   Maya's: negative bilaterally Thoracic spine: Inspection: normal   Palpation:No tenderness over the midline and paraspinal area, bilaterally  Range of motion: normal in flexion, extension rotation bilateral and is not painful. Lumbar spine: Inspection: normal and surgical incision scar   Spine Range of motion: Normal flexion, pain with extension Normal, Lateral bending, and rotation bilaterally reduced is somewhat painful. Palpation:tenderness paravertebral muscles. Facet Loading: left-negative and right-negative.      Musculoskeletal:  Sacroiliac joint tenderness No bilaterally   AMINTA test: negative bilaterally   Gaenslen's test:negative bilaterally  Piriformis tenderness: negative bilaterally   Knee  Left Knee  Mild effusion  TTP over medial compartment  Good ROM  Negative Lachman's   SLR : minimal on the right   Trochanteric bursa tenderness: negative bilaterally   CVA tenderness: No       Extremities:  Tremors: None bilaterally upper and lower   Range of motion:  grossly normal  Shoulders: Generally normal shoulders ,    Hips: no pain with internal rotation of hips   Varicose veins: absent    Pulses: present Lt radial   Cyanosis: none   Edema: none x all 4 extremities      Neurological: Sensory:normal to light touch except for decreased sensation over the latera/anterior right calf , CN 2-12 grossly intact      MOTOR Left (x/5) Right (x/5)   Shoulder Abduction (C5)  5 5   Biceps - Elbow Flexion (C6)  5 5   Triceps - Elbow Extension (C7)  5 5   Hand  (C8)  5 5   Iliopsoas - Hip flex /Abd (L2)  5 5   Quadriceps - Knee Ext (L3)  5 5   Ant Tibialis - Ankle Dorsiflex (L4)  5 5   Post Tibialis - Hip Ext/Abd Foot dorsiflex (L5)  5 5   Gastrocnemius - Plantar flex (S1)  5 5      REFLEXES Left Right   Brachioradialis (C5/6)  2+ 2+   Biceps (C5/6)  2+ 2+   Triceps (C7)  2+ 2+   Quadriceps / Patellar (L4)  2+ 2+   Achilles (S1)  2+ 2+      Gait:normal     Dermatology: Skin:no unusual rashes      Impression:  Assessment/Plan:  Diagnoses and all orders for this visit:  Lumbar radiculopathy  -     meloxicam (MOBIC) 15 MG tablet; Take 1 tablet by mouth daily as needed for Pain  Lumbar spondylosis  Peripheral polyneuropathy  Chronic pain of left knee  Chronic right-sided low back pain with right-sided sciatica  -     meloxicam (MOBIC) 15 MG tablet; Take 1 tablet by mouth daily as needed for Pain    57 y.o. male with h/o  HTN, hypothyroidism, BPH, GERD, HLD and peripheral neuropathy who presents for follow up of  LBP and b/l LE Pain that has been ongoing for the past 3 months. He is s/p L5-S1 diskectomy [place] in [place], [place], by Dr. Jasmina Pinedo in 10/2006 and has had permanent numbness/tingling/pain in the LLE from the buttock to the anterior thigh to the dorsum of the foot. He has completed PT, taking Lyrica 150mg TID, and meloxicam as needed. MRI Lumbar spine (reviewed with pt) was significant for  Large right paracentral/foraminal disc extrusion at L4-L5 with cephalad migration of disc, contacting the exiting right L4 and L5 nerve roots, as well as moderate central spinal canal narrowing at L4-L5. There was also Multilevel foraminal narrowing, as above, greatest on the right at L4-L5 and bilaterally at L5-S1. XR Flex /ex showed no instability. XR left knee showed large joint effusion with mild-mod narrowing of medial compartment. PE was minimal SLR on the right, TTP over the medial aspect of the left knee, decreased sensation over the right calf. He is s/p Right paramedian L4/5 GERMAIN on 1/24/23 with 40% pain relief (mostly on right side) as well as L4/5 GERMAIN on 2/21/23 with minimal relief. Given his lack of relief from the most recent GERMAIN, he followed up with Dr. Elisha Sanford and is now scheduled for L4 L5 bilateral laminectomy for discectomy, 3/27/23 .      He did have a question regarding opioid medication and I discussed our policy that chronic opioid therapy is not indicated given his use of marijuana, as well as his upcoming surgery. Discussed that he should quit all smoking in prep for surgery to reduce infxn/wound healing issues. We will prescribe him mobic 15mg daily as needed for pain but he will need to stop this per Dr. Samantha Bruno prior to surgery. In regards to his knee, we will have to wait until his surgery to address it further, including interventions if indcated. We will see him back after surgery. Plan:   Therapy: HEP  Imaging: no new  Medications: Mobic 15mg daily PRN for pain  Interventions:no  Consults: follow up NSGY  Follow up: after surgery  Urine screen today: no      Counseling :  Patient encouraged to stay active and to watch/lose weight   Encouraged to continue Regular home exercise program as tolerated - stretching / strengthening. Smoking cessation counseling : yes - will quit   Treatment plan discussed with the patient including medication and procedure side effects, as well as benefits and alternatives and the patient expressed understanding. I spent a total of 27 minutes on the date of the service which included preparing to see the patient, face-to-face patient care, completing clinical documentation, obtaining and/or reviewing separately obtained history, performing a medically appropriate exam, counseling and educating the patient/family/caregiver and ordering medications, tests, or procedures. NOTE: The above documentation was prepared using voice recognition software. Every attempt was made to ensure accuracy but there may be spelling, grammatical, and contextual errors. Rosalva Elizabeth MD    Controlled Substances Monitoring:   No flowsheet data found. OARRS report reviewed 3/8/23   CC:     No referring provider defined for this encounter.    Sophia Steen, APRN - CNP   [id number]  [address] [facility]

## 2023-03-08 NOTE — PROGRESS NOTES
Do you currently have any of the following:    Fever: No  Headache:  No  Cough: No  Shortness of breath: No  Exposed to anyone with these symptoms: No         Gamaliel Betancourt presents to the [place] [id number] on 3/8/2023. Stephie Nixon is complaining of pain low back. The pain is constant. The pain is described as miserable. Pain is rated on his best day at a 6, on his worst day at a 10, and on average at a 8 on the VAS scale. He took his last dose of Lyrica this morning . Any procedures since your last visit: Yes, with 0 % relief. Pacemaker or defibrillator: No     He is not on NSAIDS and is not on anticoagulation medications   Medication Contract and Consent for Opioid Use Documents Filed        No documents found                    Resp 16   Ht 5' 11\" (1.803 m)   Wt 210 lb (95.3 kg)   BMI 29.29 kg/m²      No LMP for male patient.

## 2023-03-13 NOTE — PROGRESS NOTES
[address]   PRE-ADMISSION TESTING GENERAL INSTRUCTIONS  [facility] Phone Number: [phone]      GENERAL INSTRUCTIONS:    [x] Antibacterial Soap Shower Night before and/or AM of surgery. [x] Do not wear makeup, lotions, powders, deodorant. [x] Nothing by mouth after midnight; including gum, candy, mints, or water. [x] You may brush your teeth, gargle, but do NOT swallow water. [x] No tobacco products, illegal drugs, or alcohol within 24 hours of your surgery. [x] Jewelry or valuables should not be brought to the hospital. All body and/or tongue piercing's must be removed prior to arriving to hospital. No contact lens or hair pins. [x] Arrange transportation with a responsible adult  to and from the hospital. Arrange for someone to be with you for the remainder of the day and for 24 hours after your procedure due to having had anesthesia. -Who will be your  for transportation?__Riya____   [x] Bring insurance card and photo ID. [x] Transfusion Bracelet: Please bring with you to hospital, day of surgery. PARKING INSTRUCTIONS:     [x] ARRIVAL DATE & TIME: 3/27 @ 1100am  [x] Enter into the [employer]. Two people may accompany you. Masks are not required but are recommended. [x] Parking Lot \"I\" is where you will park. It is located on the corner of [address] and [address]. The entrance is on [address] [facility]. Upon entering the parking lot, a voucher ticket will print    EDUCATION INSTRUCTIONS:          [x] Kj 77 placed in chart. [x] Pre-admission Testing educational folder given  [x] Incentive Spirometry,coughing & deep breathing exercises reviewed. [x] Medication information sheet(s)   [x] Fluoroscopy-Xray used in surgery reviewed with patient. Educational pamphlet placed in chart. [x] Pain: Post-op pain is normal and to be expected. You will be asked to rate your pain from 0-10.     MEDICATION INSTRUCTIONS:    [x] Bring a complete list of your medications, please write the last time you took the medicine, give this list to the nurse in Pre-Op. [x] Take only the following medications the morning of surgery with 1-2 ounces of water: Lyrica, amlodipine, omeprazole  [x] Stop all herbal supplements and vitamins 5 days before surgery. Stop NSAIDS 7 days before surgery. Lat dose of Vitamins 3/21  [x] Follow physician instructions regarding any blood thinners you may be taking. WHAT TO EXPECT:    [x] The day of surgery you will be greeted and checked in by the [employer].  In addition, you will be registered in the [place] by a Patient Access Representative. Please bring your photo ID and insurance card. A nurse will greet you in accordance to the time you are needed in the pre-op area to prepare you for surgery. Please do not be discouraged if you are not greeted in the order you arrive as there are many variables that are involved in patient preparation. Your patience is greatly appreciated as you wait for your nurse. Please bring in items such as: books, magazines, newspapers, electronics, or any other items  to occupy your time in the waiting area. [x]  Delays may occur with surgery and staff will make a sincere effort to keep you informed of delays. If any delays occur with your procedure, we apologize ahead of time for your inconvenience as we recognize the value of your time.

## 2023-03-17 ENCOUNTER — HOSPITAL ENCOUNTER (OUTPATIENT)
Dept: PREADMISSION TESTING | Age: 63
Discharge: HOME | End: 2023-03-17
Payer: MEDICARE

## 2023-03-17 ENCOUNTER — HOSPITAL ENCOUNTER (OUTPATIENT)
Dept: GENERAL RADIOLOGY | Age: 63
End: 2023-03-17
Payer: MEDICARE

## 2023-03-17 VITALS
HEIGHT: 71 IN | TEMPERATURE: 97.7 F | HEART RATE: 78 BPM | DIASTOLIC BLOOD PRESSURE: 86 MMHG | SYSTOLIC BLOOD PRESSURE: 147 MMHG | RESPIRATION RATE: 16 BRPM | OXYGEN SATURATION: 96 % | BODY MASS INDEX: 30.1 KG/M2 | WEIGHT: 215 LBS

## 2023-03-17 DIAGNOSIS — Z01.818 PRE-OP TESTING: ICD-10-CM

## 2023-03-17 LAB
ABO + RH BLD: NORMAL
ANION GAP SERPL CALCULATED.3IONS-SCNC: 15 MMOL/L (ref 7–16)
BASOPHILS # BLD: 0.04 E9/L (ref 0–0.2)
BASOPHILS NFR BLD: 0.7 % (ref 0–2)
BILIRUB UR QL STRIP: NEGATIVE
BLD GP AB SCN SERPL QL: NORMAL
BUN SERPL-MCNC: 10 MG/DL (ref 6–23)
CALCIUM SERPL-MCNC: 9.2 MG/DL (ref 8.6–10.2)
CHLORIDE SERPL-SCNC: 105 MMOL/L (ref 98–107)
CLARITY UR: CLEAR
CO2 SERPL-SCNC: 23 MMOL/L (ref 22–29)
COLOR UR: YELLOW
CREAT SERPL-MCNC: 0.8 MG/DL (ref 0.7–1.2)
EOSINOPHIL # BLD: 0.12 E9/L (ref 0.05–0.5)
EOSINOPHIL NFR BLD: 2.1 % (ref 0–6)
ERYTHROCYTE [DISTWIDTH] IN BLOOD BY AUTOMATED COUNT: 12.8 FL (ref 11.5–15)
GLUCOSE SERPL-MCNC: 111 MG/DL (ref 74–99)
GLUCOSE UR STRIP-MCNC: NEGATIVE MG/DL
HCT VFR BLD AUTO: 40.2 % (ref 37–54)
HGB BLD-MCNC: 13.5 G/DL (ref 12.5–16.5)
HGB UR QL STRIP: NEGATIVE
IMM GRANULOCYTES # BLD: 0.04 E9/L
IMM GRANULOCYTES NFR BLD: 0.7 % (ref 0–5)
INR BLD: 1.1
KETONES UR STRIP-MCNC: NEGATIVE MG/DL
LEUKOCYTE ESTERASE UR QL STRIP: NEGATIVE
LYMPHOCYTES # BLD: 1.64 E9/L (ref 1.5–4)
LYMPHOCYTES NFR BLD: 29.2 % (ref 20–42)
MCH RBC QN AUTO: 31 PG (ref 26–35)
MCHC RBC AUTO-ENTMCNC: 33.6 % (ref 32–34.5)
MCV RBC AUTO: 92.4 FL (ref 80–99.9)
MONOCYTES # BLD: 0.67 E9/L (ref 0.1–0.95)
MONOCYTES NFR BLD: 11.9 % (ref 2–12)
NEUTROPHILS # BLD: 3.1 E9/L (ref 1.8–7.3)
NEUTS SEG NFR BLD: 55.4 % (ref 43–80)
NITRITE UR QL STRIP: NEGATIVE
PH UR STRIP: 6.5 [PH] (ref 5–9)
PLATELET # BLD AUTO: 124 E9/L (ref 130–450)
PMV BLD AUTO: 9.8 FL (ref 7–12)
POTASSIUM SERPL-SCNC: 4.2 MMOL/L (ref 3.5–5)
PROT UR STRIP-MCNC: NEGATIVE MG/DL
PROTHROMBIN TIME: 12.3 SEC (ref 9.3–12.4)
RBC # BLD AUTO: 4.35 E12/L (ref 3.8–5.8)
SODIUM SERPL-SCNC: 143 MMOL/L (ref 132–146)
SP GR UR STRIP: <=1.005 (ref 1–1.03)
UROBILINOGEN UR STRIP-ACNC: 0.2 E.U./DL
WBC # BLD: 5.6 E9/L (ref 4.5–11.5)

## 2023-03-17 PROCEDURE — 87088 URINE BACTERIA CULTURE: CPT

## 2023-03-17 PROCEDURE — 80048 BASIC METABOLIC PNL TOTAL CA: CPT

## 2023-03-17 PROCEDURE — 71046 X-RAY EXAM CHEST 2 VIEWS: CPT

## 2023-03-17 PROCEDURE — 36415 COLL VENOUS BLD VENIPUNCTURE: CPT

## 2023-03-17 PROCEDURE — 86850 RBC ANTIBODY SCREEN: CPT

## 2023-03-17 PROCEDURE — 81003 URINALYSIS AUTO W/O SCOPE: CPT

## 2023-03-17 PROCEDURE — 86901 BLOOD TYPING SEROLOGIC RH(D): CPT

## 2023-03-17 PROCEDURE — 85025 COMPLETE CBC W/AUTO DIFF WBC: CPT

## 2023-03-17 PROCEDURE — 85610 PROTHROMBIN TIME: CPT

## 2023-03-17 PROCEDURE — 86900 BLOOD TYPING SEROLOGIC ABO: CPT

## 2023-03-17 ASSESSMENT — PAIN DESCRIPTION - LOCATION: LOCATION: LEG

## 2023-03-17 ASSESSMENT — PAIN DESCRIPTION - DESCRIPTORS: DESCRIPTORS: ACHING

## 2023-03-17 ASSESSMENT — PAIN SCALES - GENERAL: PAINLEVEL_OUTOF10: 6

## 2023-03-17 ASSESSMENT — PAIN DESCRIPTION - PAIN TYPE: TYPE: CHRONIC PAIN

## 2023-03-17 ASSESSMENT — PAIN DESCRIPTION - FREQUENCY: FREQUENCY: CONTINUOUS

## 2023-03-17 ASSESSMENT — PAIN DESCRIPTION - ORIENTATION: ORIENTATION: RIGHT;LEFT

## 2023-03-17 NOTE — PROGRESS NOTES
Use of incentive spirometry reviewed with patient. Patient demonstrated the use. Incentive spirometry given to patient to take home instructed patient to bring day of surgery. Instructed patient regarding coughing and deep breathing exercises. Patient verbalized understanding.

## 2023-03-17 NOTE — PROGRESS NOTES
Abnormal CBC with Auto Differential abnormal results from today routed t [facility] and also to SANDEE Booker. Perfect served SANDEE Booker.

## 2023-03-19 LAB — BACTERIA UR CULT: NORMAL

## 2023-03-27 ENCOUNTER — ANESTHESIA (OUTPATIENT)
Dept: OPERATING ROOM | Age: 63
End: 2023-03-27
Payer: MEDICARE

## 2023-03-27 ENCOUNTER — APPOINTMENT (OUTPATIENT)
Dept: GENERAL RADIOLOGY | Age: 63
End: 2023-03-27
Attending: NEUROLOGICAL SURGERY
Payer: MEDICARE

## 2023-03-27 ENCOUNTER — ANESTHESIA EVENT (OUTPATIENT)
Dept: OPERATING ROOM | Age: 63
End: 2023-03-27
Payer: MEDICARE

## 2023-03-27 ENCOUNTER — HOSPITAL ENCOUNTER (OUTPATIENT)
Age: 63
Setting detail: OBSERVATION
Discharge: HOME | End: 2023-03-28
Attending: NEUROLOGICAL SURGERY | Admitting: NEUROLOGICAL SURGERY
Payer: MEDICARE

## 2023-03-27 DIAGNOSIS — M51.27 LUMBOSACRAL DISC HERNIATION: Primary | ICD-10-CM

## 2023-03-27 DIAGNOSIS — Z98.890 S/P LAMINECTOMY: ICD-10-CM

## 2023-03-27 DIAGNOSIS — M51.16 LUMBAR DISC HERNIATION WITH RADICULOPATHY: ICD-10-CM

## 2023-03-27 DIAGNOSIS — M54.16 LUMBAR RADICULOPATHY: ICD-10-CM

## 2023-03-27 PROCEDURE — 2580000003 HC RX 258: Performed by: NEUROLOGICAL SURGERY

## 2023-03-27 PROCEDURE — C1729 CATH, DRAINAGE: HCPCS | Performed by: NEUROLOGICAL SURGERY

## 2023-03-27 PROCEDURE — 6370000000 HC RX 637 (ALT 250 FOR IP): Performed by: NEUROLOGICAL SURGERY

## 2023-03-27 PROCEDURE — 3700000001 HC ADD 15 MINUTES (ANESTHESIA): Performed by: NEUROLOGICAL SURGERY

## 2023-03-27 PROCEDURE — A4217 STERILE WATER/SALINE, 500 ML: HCPCS | Performed by: NEUROLOGICAL SURGERY

## 2023-03-27 PROCEDURE — 2720000010 HC SURG SUPPLY STERILE: Performed by: NEUROLOGICAL SURGERY

## 2023-03-27 PROCEDURE — 3700000000 HC ANESTHESIA ATTENDED CARE: Performed by: NEUROLOGICAL SURGERY

## 2023-03-27 PROCEDURE — 3209999900 FLUORO FOR SURGICAL PROCEDURES

## 2023-03-27 PROCEDURE — 2500000003 HC RX 250 WO HCPCS: Performed by: NURSE ANESTHETIST, CERTIFIED REGISTERED

## 2023-03-27 PROCEDURE — 6360000002 HC RX W HCPCS: Performed by: NEUROLOGICAL SURGERY

## 2023-03-27 PROCEDURE — 97165 OT EVAL LOW COMPLEX 30 MIN: CPT

## 2023-03-27 PROCEDURE — 6360000002 HC RX W HCPCS: Performed by: NURSE ANESTHETIST, CERTIFIED REGISTERED

## 2023-03-27 PROCEDURE — G0378 HOSPITAL OBSERVATION PER HR: HCPCS

## 2023-03-27 PROCEDURE — 3600000014 HC SURGERY LEVEL 4 ADDTL 15MIN: Performed by: NEUROLOGICAL SURGERY

## 2023-03-27 PROCEDURE — 63030 LAMOT DCMPRN NRV RT 1 LMBR: CPT | Performed by: NEUROLOGICAL SURGERY

## 2023-03-27 PROCEDURE — 97530 THERAPEUTIC ACTIVITIES: CPT

## 2023-03-27 PROCEDURE — 2580000003 HC RX 258: Performed by: STUDENT IN AN ORGANIZED HEALTH CARE EDUCATION/TRAINING PROGRAM

## 2023-03-27 PROCEDURE — 3600000004 HC SURGERY LEVEL 4 BASE: Performed by: NEUROLOGICAL SURGERY

## 2023-03-27 PROCEDURE — 2500000003 HC RX 250 WO HCPCS: Performed by: NEUROLOGICAL SURGERY

## 2023-03-27 PROCEDURE — 63030 LAMOT DCMPRN NRV RT 1 LMBR: CPT | Performed by: STUDENT IN AN ORGANIZED HEALTH CARE EDUCATION/TRAINING PROGRAM

## 2023-03-27 PROCEDURE — 88304 TISSUE EXAM BY PATHOLOGIST: CPT

## 2023-03-27 PROCEDURE — 2709999900 HC NON-CHARGEABLE SUPPLY: Performed by: NEUROLOGICAL SURGERY

## 2023-03-27 PROCEDURE — 7100000001 HC PACU RECOVERY - ADDTL 15 MIN: Performed by: NEUROLOGICAL SURGERY

## 2023-03-27 PROCEDURE — 7100000000 HC PACU RECOVERY - FIRST 15 MIN: Performed by: NEUROLOGICAL SURGERY

## 2023-03-27 PROCEDURE — 97161 PT EVAL LOW COMPLEX 20 MIN: CPT

## 2023-03-27 PROCEDURE — 6370000000 HC RX 637 (ALT 250 FOR IP): Performed by: STUDENT IN AN ORGANIZED HEALTH CARE EDUCATION/TRAINING PROGRAM

## 2023-03-27 PROCEDURE — 6360000002 HC RX W HCPCS: Performed by: STUDENT IN AN ORGANIZED HEALTH CARE EDUCATION/TRAINING PROGRAM

## 2023-03-27 PROCEDURE — 6360000002 HC RX W HCPCS: Performed by: ANESTHESIOLOGY

## 2023-03-27 RX ORDER — AMLODIPINE BESYLATE 5 MG/1
5 TABLET ORAL DAILY
Status: DISCONTINUED | OUTPATIENT
Start: 2023-03-28 | End: 2023-03-28 | Stop reason: HOSPADM

## 2023-03-27 RX ORDER — IPRATROPIUM BROMIDE AND ALBUTEROL SULFATE 2.5; .5 MG/3ML; MG/3ML
1 SOLUTION RESPIRATORY (INHALATION)
Status: DISCONTINUED | OUTPATIENT
Start: 2023-03-27 | End: 2023-03-27 | Stop reason: HOSPADM

## 2023-03-27 RX ORDER — OXYCODONE HYDROCHLORIDE 5 MG/1
5 TABLET ORAL EVERY 4 HOURS PRN
Status: DISCONTINUED | OUTPATIENT
Start: 2023-03-27 | End: 2023-03-28 | Stop reason: HOSPADM

## 2023-03-27 RX ORDER — SODIUM CHLORIDE 9 MG/ML
25 INJECTION, SOLUTION INTRAVENOUS PRN
Status: DISCONTINUED | OUTPATIENT
Start: 2023-03-27 | End: 2023-03-27 | Stop reason: HOSPADM

## 2023-03-27 RX ORDER — SODIUM CHLORIDE 9 MG/ML
INJECTION, SOLUTION INTRAVENOUS CONTINUOUS
Status: DISCONTINUED | OUTPATIENT
Start: 2023-03-27 | End: 2023-03-27 | Stop reason: HOSPADM

## 2023-03-27 RX ORDER — FENTANYL CITRATE 50 UG/ML
INJECTION, SOLUTION INTRAMUSCULAR; INTRAVENOUS PRN
Status: DISCONTINUED | OUTPATIENT
Start: 2023-03-27 | End: 2023-03-27 | Stop reason: SDUPTHER

## 2023-03-27 RX ORDER — ONDANSETRON 2 MG/ML
4 INJECTION INTRAMUSCULAR; INTRAVENOUS EVERY 6 HOURS PRN
Status: DISCONTINUED | OUTPATIENT
Start: 2023-03-27 | End: 2023-03-28 | Stop reason: HOSPADM

## 2023-03-27 RX ORDER — SODIUM CHLORIDE 0.9 % (FLUSH) 0.9 %
5-40 SYRINGE (ML) INJECTION PRN
Status: DISCONTINUED | OUTPATIENT
Start: 2023-03-27 | End: 2023-03-27 | Stop reason: HOSPADM

## 2023-03-27 RX ORDER — LABETALOL HYDROCHLORIDE 5 MG/ML
5 INJECTION, SOLUTION INTRAVENOUS
Status: DISCONTINUED | OUTPATIENT
Start: 2023-03-27 | End: 2023-03-27 | Stop reason: HOSPADM

## 2023-03-27 RX ORDER — DIPHENHYDRAMINE HYDROCHLORIDE 50 MG/ML
12.5 INJECTION INTRAMUSCULAR; INTRAVENOUS
Status: DISCONTINUED | OUTPATIENT
Start: 2023-03-27 | End: 2023-03-27 | Stop reason: HOSPADM

## 2023-03-27 RX ORDER — LEVOTHYROXINE SODIUM 0.05 MG/1
50 TABLET ORAL DAILY
Status: DISCONTINUED | OUTPATIENT
Start: 2023-03-27 | End: 2023-03-28 | Stop reason: HOSPADM

## 2023-03-27 RX ORDER — SODIUM CHLORIDE 0.9 % (FLUSH) 0.9 %
5-40 SYRINGE (ML) INJECTION PRN
Status: DISCONTINUED | OUTPATIENT
Start: 2023-03-27 | End: 2023-03-28 | Stop reason: HOSPADM

## 2023-03-27 RX ORDER — MIDAZOLAM HYDROCHLORIDE 2 MG/2ML
2 INJECTION, SOLUTION INTRAMUSCULAR; INTRAVENOUS
Status: DISCONTINUED | OUTPATIENT
Start: 2023-03-27 | End: 2023-03-27 | Stop reason: HOSPADM

## 2023-03-27 RX ORDER — ACETAMINOPHEN 325 MG/1
650 TABLET ORAL
Status: DISCONTINUED | OUTPATIENT
Start: 2023-03-27 | End: 2023-03-27 | Stop reason: HOSPADM

## 2023-03-27 RX ORDER — MORPHINE SULFATE 2 MG/ML
2 INJECTION, SOLUTION INTRAMUSCULAR; INTRAVENOUS
Status: DISCONTINUED | OUTPATIENT
Start: 2023-03-27 | End: 2023-03-28 | Stop reason: HOSPADM

## 2023-03-27 RX ORDER — MORPHINE SULFATE 4 MG/ML
4 INJECTION, SOLUTION INTRAMUSCULAR; INTRAVENOUS
Status: DISCONTINUED | OUTPATIENT
Start: 2023-03-27 | End: 2023-03-28 | Stop reason: HOSPADM

## 2023-03-27 RX ORDER — ONDANSETRON 4 MG/1
4 TABLET, ORALLY DISINTEGRATING ORAL EVERY 8 HOURS PRN
Status: DISCONTINUED | OUTPATIENT
Start: 2023-03-27 | End: 2023-03-28 | Stop reason: HOSPADM

## 2023-03-27 RX ORDER — DROPERIDOL 2.5 MG/ML
0.62 INJECTION, SOLUTION INTRAMUSCULAR; INTRAVENOUS
Status: DISCONTINUED | OUTPATIENT
Start: 2023-03-27 | End: 2023-03-27 | Stop reason: HOSPADM

## 2023-03-27 RX ORDER — CYCLOBENZAPRINE HCL 10 MG
10 TABLET ORAL 3 TIMES DAILY PRN
Status: DISCONTINUED | OUTPATIENT
Start: 2023-03-27 | End: 2023-03-28 | Stop reason: HOSPADM

## 2023-03-27 RX ORDER — METHYLPREDNISOLONE ACETATE 80 MG/ML
INJECTION, SUSPENSION INTRA-ARTICULAR; INTRALESIONAL; INTRAMUSCULAR; SOFT TISSUE PRN
Status: DISCONTINUED | OUTPATIENT
Start: 2023-03-27 | End: 2023-03-27 | Stop reason: HOSPADM

## 2023-03-27 RX ORDER — MORPHINE SULFATE 1 MG/ML
INJECTION, SOLUTION EPIDURAL; INTRATHECAL; INTRAVENOUS PRN
Status: DISCONTINUED | OUTPATIENT
Start: 2023-03-27 | End: 2023-03-27 | Stop reason: HOSPADM

## 2023-03-27 RX ORDER — LIDOCAINE HYDROCHLORIDE 20 MG/ML
INJECTION, SOLUTION INTRAVENOUS PRN
Status: DISCONTINUED | OUTPATIENT
Start: 2023-03-27 | End: 2023-03-27 | Stop reason: SDUPTHER

## 2023-03-27 RX ORDER — ROSUVASTATIN CALCIUM 20 MG/1
20 TABLET, COATED ORAL DAILY
Status: DISCONTINUED | OUTPATIENT
Start: 2023-03-27 | End: 2023-03-28 | Stop reason: HOSPADM

## 2023-03-27 RX ORDER — ONDANSETRON 2 MG/ML
4 INJECTION INTRAMUSCULAR; INTRAVENOUS
Status: DISCONTINUED | OUTPATIENT
Start: 2023-03-27 | End: 2023-03-27 | Stop reason: HOSPADM

## 2023-03-27 RX ORDER — MIDAZOLAM HYDROCHLORIDE 1 MG/ML
INJECTION INTRAMUSCULAR; INTRAVENOUS PRN
Status: DISCONTINUED | OUTPATIENT
Start: 2023-03-27 | End: 2023-03-27 | Stop reason: SDUPTHER

## 2023-03-27 RX ORDER — FINASTERIDE 5 MG/1
5 TABLET, FILM COATED ORAL DAILY
Status: DISCONTINUED | OUTPATIENT
Start: 2023-03-27 | End: 2023-03-28 | Stop reason: HOSPADM

## 2023-03-27 RX ORDER — SODIUM CHLORIDE 0.9 % (FLUSH) 0.9 %
5-40 SYRINGE (ML) INJECTION EVERY 12 HOURS SCHEDULED
Status: DISCONTINUED | OUTPATIENT
Start: 2023-03-27 | End: 2023-03-27 | Stop reason: HOSPADM

## 2023-03-27 RX ORDER — BUPIVACAINE HYDROCHLORIDE 2.5 MG/ML
INJECTION, SOLUTION EPIDURAL; INFILTRATION; INTRACAUDAL PRN
Status: DISCONTINUED | OUTPATIENT
Start: 2023-03-27 | End: 2023-03-27 | Stop reason: HOSPADM

## 2023-03-27 RX ORDER — PANTOPRAZOLE SODIUM 40 MG/1
40 TABLET, DELAYED RELEASE ORAL
Status: DISCONTINUED | OUTPATIENT
Start: 2023-03-28 | End: 2023-03-28 | Stop reason: HOSPADM

## 2023-03-27 RX ORDER — PHENYLEPHRINE HCL IN 0.9% NACL 1 MG/10 ML
SYRINGE (ML) INTRAVENOUS PRN
Status: DISCONTINUED | OUTPATIENT
Start: 2023-03-27 | End: 2023-03-27 | Stop reason: SDUPTHER

## 2023-03-27 RX ORDER — BACITRACIN ZINC 500 [USP'U]/G
OINTMENT TOPICAL PRN
Status: DISCONTINUED | OUTPATIENT
Start: 2023-03-27 | End: 2023-03-27 | Stop reason: HOSPADM

## 2023-03-27 RX ORDER — PROPOFOL 10 MG/ML
INJECTION, EMULSION INTRAVENOUS PRN
Status: DISCONTINUED | OUTPATIENT
Start: 2023-03-27 | End: 2023-03-27 | Stop reason: SDUPTHER

## 2023-03-27 RX ORDER — SODIUM PHOSPHATE, DIBASIC AND SODIUM PHOSPHATE, MONOBASIC 7; 19 G/133ML; G/133ML
1 ENEMA RECTAL DAILY PRN
Status: DISCONTINUED | OUTPATIENT
Start: 2023-03-27 | End: 2023-03-28 | Stop reason: HOSPADM

## 2023-03-27 RX ORDER — PREGABALIN 75 MG/1
150 CAPSULE ORAL 3 TIMES DAILY
Status: DISCONTINUED | OUTPATIENT
Start: 2023-03-27 | End: 2023-03-28 | Stop reason: HOSPADM

## 2023-03-27 RX ORDER — ACETAMINOPHEN 325 MG/1
650 TABLET ORAL EVERY 6 HOURS
Status: DISCONTINUED | OUTPATIENT
Start: 2023-03-27 | End: 2023-03-28 | Stop reason: HOSPADM

## 2023-03-27 RX ORDER — POLYETHYLENE GLYCOL 3350 17 G/17G
17 POWDER, FOR SOLUTION ORAL DAILY
Status: DISCONTINUED | OUTPATIENT
Start: 2023-03-27 | End: 2023-03-28 | Stop reason: HOSPADM

## 2023-03-27 RX ORDER — LIDOCAINE HYDROCHLORIDE AND EPINEPHRINE 10; 10 MG/ML; UG/ML
INJECTION, SOLUTION INFILTRATION; PERINEURAL PRN
Status: DISCONTINUED | OUTPATIENT
Start: 2023-03-27 | End: 2023-03-27 | Stop reason: HOSPADM

## 2023-03-27 RX ORDER — SODIUM CHLORIDE 9 MG/ML
INJECTION, SOLUTION INTRAVENOUS PRN
Status: DISCONTINUED | OUTPATIENT
Start: 2023-03-27 | End: 2023-03-28 | Stop reason: HOSPADM

## 2023-03-27 RX ORDER — SENNA AND DOCUSATE SODIUM 50; 8.6 MG/1; MG/1
1 TABLET, FILM COATED ORAL 2 TIMES DAILY
Status: DISCONTINUED | OUTPATIENT
Start: 2023-03-27 | End: 2023-03-28 | Stop reason: HOSPADM

## 2023-03-27 RX ORDER — DEXAMETHASONE SODIUM PHOSPHATE 10 MG/ML
INJECTION INTRAMUSCULAR; INTRAVENOUS PRN
Status: DISCONTINUED | OUTPATIENT
Start: 2023-03-27 | End: 2023-03-27 | Stop reason: SDUPTHER

## 2023-03-27 RX ORDER — ROCURONIUM BROMIDE 10 MG/ML
INJECTION, SOLUTION INTRAVENOUS PRN
Status: DISCONTINUED | OUTPATIENT
Start: 2023-03-27 | End: 2023-03-27 | Stop reason: SDUPTHER

## 2023-03-27 RX ORDER — HYDRALAZINE HYDROCHLORIDE 20 MG/ML
5 INJECTION INTRAMUSCULAR; INTRAVENOUS
Status: DISCONTINUED | OUTPATIENT
Start: 2023-03-27 | End: 2023-03-27 | Stop reason: HOSPADM

## 2023-03-27 RX ORDER — OXYCODONE HYDROCHLORIDE 10 MG/1
10 TABLET ORAL EVERY 4 HOURS PRN
Status: DISCONTINUED | OUTPATIENT
Start: 2023-03-27 | End: 2023-03-28 | Stop reason: HOSPADM

## 2023-03-27 RX ORDER — BISACODYL 5 MG/1
5 TABLET, DELAYED RELEASE ORAL DAILY
Status: DISCONTINUED | OUTPATIENT
Start: 2023-03-27 | End: 2023-03-28 | Stop reason: HOSPADM

## 2023-03-27 RX ORDER — SODIUM CHLORIDE 9 MG/ML
INJECTION, SOLUTION INTRAVENOUS PRN
Status: DISCONTINUED | OUTPATIENT
Start: 2023-03-27 | End: 2023-03-27 | Stop reason: HOSPADM

## 2023-03-27 RX ORDER — ONDANSETRON 2 MG/ML
INJECTION INTRAMUSCULAR; INTRAVENOUS PRN
Status: DISCONTINUED | OUTPATIENT
Start: 2023-03-27 | End: 2023-03-27 | Stop reason: SDUPTHER

## 2023-03-27 RX ORDER — TAMSULOSIN HYDROCHLORIDE 0.4 MG/1
0.4 CAPSULE ORAL DAILY
Status: DISCONTINUED | OUTPATIENT
Start: 2023-03-27 | End: 2023-03-28 | Stop reason: HOSPADM

## 2023-03-27 RX ORDER — SODIUM CHLORIDE 0.9 % (FLUSH) 0.9 %
5-40 SYRINGE (ML) INJECTION EVERY 12 HOURS SCHEDULED
Status: DISCONTINUED | OUTPATIENT
Start: 2023-03-27 | End: 2023-03-28 | Stop reason: HOSPADM

## 2023-03-27 RX ADMIN — FINASTERIDE 5 MG: 5 TABLET, FILM COATED ORAL at 14:51

## 2023-03-27 RX ADMIN — ROCURONIUM BROMIDE 50 MG: 10 INJECTION, SOLUTION INTRAVENOUS at 09:09

## 2023-03-27 RX ADMIN — LIDOCAINE HYDROCHLORIDE 80 MG: 20 INJECTION, SOLUTION INTRAVENOUS at 09:09

## 2023-03-27 RX ADMIN — ROCURONIUM BROMIDE 20 MG: 10 INJECTION, SOLUTION INTRAVENOUS at 09:52

## 2023-03-27 RX ADMIN — SODIUM CHLORIDE: 9 INJECTION, SOLUTION INTRAVENOUS at 10:46

## 2023-03-27 RX ADMIN — PREGABALIN 150 MG: 75 CAPSULE ORAL at 20:31

## 2023-03-27 RX ADMIN — FENTANYL CITRATE 100 MCG: 50 INJECTION, SOLUTION INTRAMUSCULAR; INTRAVENOUS at 09:09

## 2023-03-27 RX ADMIN — DEXAMETHASONE SODIUM PHOSPHATE 10 MG: 10 INJECTION INTRAMUSCULAR; INTRAVENOUS at 09:18

## 2023-03-27 RX ADMIN — SODIUM CHLORIDE: 9 INJECTION, SOLUTION INTRAVENOUS at 06:37

## 2023-03-27 RX ADMIN — PREGABALIN 150 MG: 75 CAPSULE ORAL at 14:51

## 2023-03-27 RX ADMIN — Medication 100 MCG: at 10:39

## 2023-03-27 RX ADMIN — LEVOTHYROXINE SODIUM 50 MCG: 0.05 TABLET ORAL at 14:52

## 2023-03-27 RX ADMIN — ROSUVASTATIN CALCIUM 20 MG: 20 TABLET, FILM COATED ORAL at 16:55

## 2023-03-27 RX ADMIN — ACETAMINOPHEN 650 MG: 325 TABLET ORAL at 14:51

## 2023-03-27 RX ADMIN — Medication 100 MCG: at 10:35

## 2023-03-27 RX ADMIN — SODIUM CHLORIDE, PRESERVATIVE FREE 10 ML: 5 INJECTION INTRAVENOUS at 09:00

## 2023-03-27 RX ADMIN — OXYCODONE HYDROCHLORIDE 10 MG: 10 TABLET ORAL at 16:55

## 2023-03-27 RX ADMIN — Medication 100 MCG: at 10:14

## 2023-03-27 RX ADMIN — FENTANYL CITRATE 100 MCG: 50 INJECTION, SOLUTION INTRAMUSCULAR; INTRAVENOUS at 11:44

## 2023-03-27 RX ADMIN — CEFAZOLIN 2000 MG: 2 INJECTION, POWDER, FOR SOLUTION INTRAMUSCULAR; INTRAVENOUS at 09:26

## 2023-03-27 RX ADMIN — ACETAMINOPHEN 650 MG: 325 TABLET ORAL at 20:30

## 2023-03-27 RX ADMIN — SODIUM CHLORIDE, PRESERVATIVE FREE 10 ML: 5 INJECTION INTRAVENOUS at 20:31

## 2023-03-27 RX ADMIN — SENNOSIDES AND DOCUSATE SODIUM 1 TABLET: 8.6; 5 TABLET ORAL at 20:31

## 2023-03-27 RX ADMIN — FENTANYL CITRATE 50 MCG: 50 INJECTION, SOLUTION INTRAMUSCULAR; INTRAVENOUS at 11:37

## 2023-03-27 RX ADMIN — MIDAZOLAM 2 MG: 1 INJECTION INTRAMUSCULAR; INTRAVENOUS at 09:03

## 2023-03-27 RX ADMIN — Medication 100 MCG: at 10:19

## 2023-03-27 RX ADMIN — HYDROMORPHONE HYDROCHLORIDE 0.25 MG: 1 INJECTION, SOLUTION INTRAMUSCULAR; INTRAVENOUS; SUBCUTANEOUS at 12:35

## 2023-03-27 RX ADMIN — ONDANSETRON 4 MG: 2 INJECTION INTRAMUSCULAR; INTRAVENOUS at 11:10

## 2023-03-27 RX ADMIN — PROPOFOL 170 MG: 10 INJECTION, EMULSION INTRAVENOUS at 09:09

## 2023-03-27 RX ADMIN — Medication 100 MCG: at 10:22

## 2023-03-27 RX ADMIN — SUGAMMADEX 195 MG: 100 INJECTION, SOLUTION INTRAVENOUS at 11:31

## 2023-03-27 RX ADMIN — Medication 100 MCG: at 10:47

## 2023-03-27 RX ADMIN — Medication 100 MCG: at 10:27

## 2023-03-27 RX ADMIN — TAMSULOSIN HYDROCHLORIDE 0.4 MG: 0.4 CAPSULE ORAL at 14:51

## 2023-03-27 ASSESSMENT — PAIN - FUNCTIONAL ASSESSMENT
PAIN_FUNCTIONAL_ASSESSMENT: ACTIVITIES ARE NOT PREVENTED
PAIN_FUNCTIONAL_ASSESSMENT: 0-10

## 2023-03-27 ASSESSMENT — PAIN DESCRIPTION - ORIENTATION
ORIENTATION: LOWER;MID
ORIENTATION: MID;LOWER

## 2023-03-27 ASSESSMENT — PAIN DESCRIPTION - ONSET
ONSET: ON-GOING

## 2023-03-27 ASSESSMENT — PAIN DESCRIPTION - DESCRIPTORS
DESCRIPTORS: SORE;ACHING;DISCOMFORT
DESCRIPTORS: DISCOMFORT;SORE
DESCRIPTORS: ACHING;DISCOMFORT;SORE

## 2023-03-27 ASSESSMENT — PAIN SCALES - GENERAL
PAINLEVEL_OUTOF10: 4
PAINLEVEL_OUTOF10: 3
PAINLEVEL_OUTOF10: 7
PAINLEVEL_OUTOF10: 4
PAINLEVEL_OUTOF10: 0
PAINLEVEL_OUTOF10: 6
PAINLEVEL_OUTOF10: 8
PAINLEVEL_OUTOF10: 6

## 2023-03-27 ASSESSMENT — PAIN DESCRIPTION - FREQUENCY
FREQUENCY: INTERMITTENT
FREQUENCY: INTERMITTENT
FREQUENCY: CONTINUOUS
FREQUENCY: INTERMITTENT

## 2023-03-27 ASSESSMENT — PAIN DESCRIPTION - PAIN TYPE
TYPE: SURGICAL PAIN

## 2023-03-27 ASSESSMENT — PAIN DESCRIPTION - LOCATION
LOCATION: BACK

## 2023-03-27 ASSESSMENT — LIFESTYLE VARIABLES: SMOKING_STATUS: 1

## 2023-03-27 NOTE — ANESTHESIA PRE PROCEDURE
consumption: 1700                        Date of last liquid consumption: 03/27/23                        Date of last solid food consumption: 03/26/23    BMI:   Wt Readings from Last 3 Encounters:   03/27/23 215 lb (97.5 kg)   03/17/23 215 lb (97.5 kg)   03/08/23 210 lb (95.3 kg)     Body mass index is 29.99 kg/m². CBC:   Lab Results   Component Value Date/Time    WBC 5.6 03/17/2023 07:30 AM    RBC 4.35 03/17/2023 07:30 AM    HGB 13.5 03/17/2023 07:30 AM    HCT 40.2 03/17/2023 07:30 AM    MCV 92.4 03/17/2023 07:30 AM    RDW 12.8 03/17/2023 07:30 AM     03/17/2023 07:30 AM       CMP:   Lab Results   Component Value Date/Time     03/17/2023 07:30 AM    K 4.2 03/17/2023 07:30 AM     03/17/2023 07:30 AM    CO2 23 03/17/2023 07:30 AM    BUN 10 03/17/2023 07:30 AM    CREATININE 0.8 03/17/2023 07:30 AM    LABGLOM >60 03/17/2023 07:30 AM    GLUCOSE 111 03/17/2023 07:30 AM    CALCIUM 9.2 03/17/2023 07:30 AM       POC Tests: No results for input(s): POCGLU, POCNA, POCK, POCCL, POCBUN, POCHEMO, POCHCT in the last 72 hours. Coags:   Lab Results   Component Value Date/Time    PROTIME 12.3 03/17/2023 07:30 AM    INR 1.1 03/17/2023 07:30 AM       HCG (If Applicable): No results found for: PREGTESTUR, PREGSERUM, HCG, HCGQUANT     ABGs: No results found for: PHART, PO2ART, FFA1AGK, BHD4VXS, BEART, W2NGCGJW     Type & Screen (If Applicable):  No results found for: LABABO, LABRH    Drug/Infectious Status (If Applicable):  No results found for: HIV, HEPCAB    COVID-19 Screening (If Applicable): No results found for: COVID19    MRI Lumbar Spine    Impression   1. Large right paracentral/foraminal disc extrusion at L4-L5 with cephalad   migration of disc, contacting the exiting right L4 and L5 nerve roots.    2. Moderate central spinal canal narrowing at L4-L5 and mild central spinal   canal narrowing at L3-L4 and L5-S1.   3. Multilevel foraminal narrowing, as above, greatest on the right at L4-L5   and

## 2023-03-27 NOTE — CONSULTS
Hospital Medicine  Consult History & Physical        Reason for consult:  medical management   Primary Care Physician:  Cooper Irwin, KAIDEN - CNP    Date of Service: Pt seen/examined in consultation on 3/27/2023    History Of Present Illness:    Mr. Duarte Mcdonald, a 57y.o. year old male  who  has a past medical history of GERD (gastroesophageal reflux disease), Hyperlipidemia, Hypertension, and Low back pain. s/p L4, L5 bilateral laminectomy for discectomy, denies chest pain no nausea or vomiting or abdominal pain, no limb weakness or tingling,no headache or vision changes. Past Medical History:        Diagnosis Date    GERD (gastroesophageal reflux disease)     Hyperlipidemia     Hypertension     Low back pain        Past Surgical History:        Procedure Laterality Date    BACK SURGERY      PAIN MANAGEMENT PROCEDURE Right 1/24/2023    LUMBAR EPIDURAL STEROID INJECTION UNDER FLUOROSCOPIC GUIDANCE AT L4-L5 RIGHT PARAMEDIAN performed by Mina Leyva MD at [facility] OR    PAIN MANAGEMENT PROCEDURE Left 2/21/2023    LUMBAR EPIDURAL STEROID INJECTION UNDER FLUOROSCOPIC GUIDANCE AT L4-L5 left PARAMEDIAN performed by Mina Leyva MD at [facility] OR       Medications Prior to Admission:    Prior to Admission medications    Medication Sig Start Date End Date Taking? Authorizing Provider   meloxicam (MOBIC) 15 MG tablet Take 1 tablet by mouth daily as needed for Pain 3/8/23   Mina Leyva MD   amLODIPine (NORVASC) 5 MG tablet Take 5 mg by mouth daily    Historical Provider, MD   omeprazole (PRILOSEC) 20 MG delayed release capsule Take 20 mg by mouth daily    Historical Provider, MD   cyanocobalamin 1000 MCG tablet Take 1,000 mcg by mouth daily    Historical Provider, MD   pregabalin (LYRICA) 150 MG capsule Take 150 mg by mouth in the morning, at noon, and at bedtime.     Historical Provider, MD   finasteride (PROSCAR) 5 MG tablet Take 5 mg by mouth daily    Historical Provider, MD   tamsulosin [facility])

## 2023-03-27 NOTE — H&P
injury to bowel bladder or sexual function, coma, cardiovascular collapse and even death. He articulated his understanding wishes to proceed. He will need to obtain medical clearance and we will schedule his case expeditiously. Nsx Staff:  Updated H and P  Pt seen and examined. There have been no interval changes. R/B/A were re-reviewed as above. All questions were answered.

## 2023-03-27 NOTE — BRIEF OP NOTE
Brief Postoperative Note      Patient: Joni Lopez  YOB: 1960  MRN: [id number]    Date of Procedure: 3/27/2023    Pre-Op Diagnosis: Lumbar disc herniation with radiculopathy [M51.16] L4-5    Post-Op Diagnosis: same       Procedure(s):  L4, L5 bilateral laminectomy for discectomy    Surgeon(s):  Terri Jarquin MD    Assistant:  Physician Assistant: TAMMY Herrera    Anesthesia: General with 1% lidocaine with epi for local and 0.25% Marcaine for im analgesia    Estimated Blood Loss (mL): 10 cc    Complications: no immediate    Specimens:   ID Type Source Tests Collected by Time Destination   A : disc Tissue Tissue SURGICAL PATHOLOGY Terri Jarquin MD 3/27/2023 1039        Implants:  Duramorph Depomedrol soaked gelfoam      Drains:   Closed/Suction Drain Medial Back (Active)       Urinary Catheter 03/27/23 Ramos (Active)       Findings: disc extrusion    Electronically signed by Terri Jarquin MD on 3/27/2023 at 11:09 AM

## 2023-03-28 VITALS
HEIGHT: 71 IN | BODY MASS INDEX: 30.1 KG/M2 | HEART RATE: 72 BPM | RESPIRATION RATE: 16 BRPM | SYSTOLIC BLOOD PRESSURE: 137 MMHG | DIASTOLIC BLOOD PRESSURE: 75 MMHG | TEMPERATURE: 97.7 F | OXYGEN SATURATION: 96 % | WEIGHT: 215 LBS

## 2023-03-28 LAB
ANION GAP SERPL CALCULATED.3IONS-SCNC: 11 MMOL/L (ref 7–16)
BASOPHILS # BLD: 0.01 E9/L (ref 0–0.2)
BASOPHILS NFR BLD: 0.1 % (ref 0–2)
BUN SERPL-MCNC: 13 MG/DL (ref 6–23)
CALCIUM SERPL-MCNC: 9.1 MG/DL (ref 8.6–10.2)
CHLORIDE SERPL-SCNC: 106 MMOL/L (ref 98–107)
CO2 SERPL-SCNC: 25 MMOL/L (ref 22–29)
CREAT SERPL-MCNC: 0.9 MG/DL (ref 0.7–1.2)
EOSINOPHIL # BLD: 0 E9/L (ref 0.05–0.5)
EOSINOPHIL NFR BLD: 0 % (ref 0–6)
ERYTHROCYTE [DISTWIDTH] IN BLOOD BY AUTOMATED COUNT: 12.5 FL (ref 11.5–15)
GLUCOSE SERPL-MCNC: 141 MG/DL (ref 74–99)
HCT VFR BLD AUTO: 36.3 % (ref 37–54)
HGB BLD-MCNC: 12.3 G/DL (ref 12.5–16.5)
IMM GRANULOCYTES # BLD: 0.05 E9/L
IMM GRANULOCYTES NFR BLD: 0.5 % (ref 0–5)
LYMPHOCYTES # BLD: 1.25 E9/L (ref 1.5–4)
LYMPHOCYTES NFR BLD: 13.1 % (ref 20–42)
MCH RBC QN AUTO: 31.8 PG (ref 26–35)
MCHC RBC AUTO-ENTMCNC: 33.9 % (ref 32–34.5)
MCV RBC AUTO: 93.8 FL (ref 80–99.9)
MONOCYTES # BLD: 0.81 E9/L (ref 0.1–0.95)
MONOCYTES NFR BLD: 8.5 % (ref 2–12)
NEUTROPHILS # BLD: 7.43 E9/L (ref 1.8–7.3)
NEUTS SEG NFR BLD: 77.8 % (ref 43–80)
PLATELET # BLD AUTO: 129 E9/L (ref 130–450)
PMV BLD AUTO: 10.5 FL (ref 7–12)
POTASSIUM SERPL-SCNC: 4.2 MMOL/L (ref 3.5–5)
RBC # BLD AUTO: 3.87 E12/L (ref 3.8–5.8)
SODIUM SERPL-SCNC: 142 MMOL/L (ref 132–146)
WBC # BLD: 9.6 E9/L (ref 4.5–11.5)

## 2023-03-28 PROCEDURE — G0378 HOSPITAL OBSERVATION PER HR: HCPCS

## 2023-03-28 PROCEDURE — 6370000000 HC RX 637 (ALT 250 FOR IP): Performed by: STUDENT IN AN ORGANIZED HEALTH CARE EDUCATION/TRAINING PROGRAM

## 2023-03-28 PROCEDURE — 97535 SELF CARE MNGMENT TRAINING: CPT

## 2023-03-28 PROCEDURE — 97530 THERAPEUTIC ACTIVITIES: CPT

## 2023-03-28 PROCEDURE — 36415 COLL VENOUS BLD VENIPUNCTURE: CPT

## 2023-03-28 PROCEDURE — 85025 COMPLETE CBC W/AUTO DIFF WBC: CPT

## 2023-03-28 PROCEDURE — 80048 BASIC METABOLIC PNL TOTAL CA: CPT

## 2023-03-28 PROCEDURE — 2580000003 HC RX 258: Performed by: STUDENT IN AN ORGANIZED HEALTH CARE EDUCATION/TRAINING PROGRAM

## 2023-03-28 RX ORDER — OXYCODONE HYDROCHLORIDE 5 MG/1
5 TABLET ORAL EVERY 4 HOURS PRN
Qty: 42 TABLET | Refills: 0 | Status: SHIPPED | OUTPATIENT
Start: 2023-03-28 | End: 2023-04-04

## 2023-03-28 RX ADMIN — PREGABALIN 150 MG: 75 CAPSULE ORAL at 15:04

## 2023-03-28 RX ADMIN — ACETAMINOPHEN 650 MG: 325 TABLET ORAL at 15:04

## 2023-03-28 RX ADMIN — SENNOSIDES AND DOCUSATE SODIUM 1 TABLET: 8.6; 5 TABLET ORAL at 08:42

## 2023-03-28 RX ADMIN — ACETAMINOPHEN 650 MG: 325 TABLET ORAL at 08:42

## 2023-03-28 RX ADMIN — OXYCODONE HYDROCHLORIDE 10 MG: 10 TABLET ORAL at 18:20

## 2023-03-28 RX ADMIN — PANTOPRAZOLE SODIUM 40 MG: 40 TABLET, DELAYED RELEASE ORAL at 05:52

## 2023-03-28 RX ADMIN — PREGABALIN 150 MG: 75 CAPSULE ORAL at 08:41

## 2023-03-28 RX ADMIN — FINASTERIDE 5 MG: 5 TABLET, FILM COATED ORAL at 08:42

## 2023-03-28 RX ADMIN — TAMSULOSIN HYDROCHLORIDE 0.4 MG: 0.4 CAPSULE ORAL at 08:41

## 2023-03-28 RX ADMIN — OXYCODONE 5 MG: 5 TABLET ORAL at 12:01

## 2023-03-28 RX ADMIN — POLYETHYLENE GLYCOL 3350 17 G: 17 POWDER, FOR SOLUTION ORAL at 08:41

## 2023-03-28 RX ADMIN — SODIUM CHLORIDE, PRESERVATIVE FREE 10 ML: 5 INJECTION INTRAVENOUS at 08:42

## 2023-03-28 RX ADMIN — LEVOTHYROXINE SODIUM 50 MCG: 0.05 TABLET ORAL at 05:52

## 2023-03-28 RX ADMIN — AMLODIPINE BESYLATE 5 MG: 5 TABLET ORAL at 08:41

## 2023-03-28 RX ADMIN — ROSUVASTATIN CALCIUM 20 MG: 20 TABLET, FILM COATED ORAL at 08:42

## 2023-03-28 RX ADMIN — BISACODYL 5 MG: 5 TABLET, COATED ORAL at 08:42

## 2023-03-28 ASSESSMENT — PAIN SCALES - GENERAL
PAINLEVEL_OUTOF10: 7
PAINLEVEL_OUTOF10: 4
PAINLEVEL_OUTOF10: 5
PAINLEVEL_OUTOF10: 4

## 2023-03-28 ASSESSMENT — PAIN DESCRIPTION - ORIENTATION
ORIENTATION: POSTERIOR
ORIENTATION: POSTERIOR

## 2023-03-28 ASSESSMENT — PAIN - FUNCTIONAL ASSESSMENT
PAIN_FUNCTIONAL_ASSESSMENT: PREVENTS OR INTERFERES SOME ACTIVE ACTIVITIES AND ADLS
PAIN_FUNCTIONAL_ASSESSMENT: PREVENTS OR INTERFERES SOME ACTIVE ACTIVITIES AND ADLS

## 2023-03-28 ASSESSMENT — PAIN DESCRIPTION - LOCATION
LOCATION: BACK
LOCATION: BACK

## 2023-03-28 ASSESSMENT — PAIN DESCRIPTION - DESCRIPTORS
DESCRIPTORS: ACHING;DISCOMFORT;SORE
DESCRIPTORS: ACHING;DISCOMFORT;SHARP;SORE

## 2023-03-28 NOTE — PROGRESS NOTES
Department of Neurosurgery  Progress Note    CHIEF COMPLAINT: s/p L4-L5 laminectomy     SUBJECTIVE:  No acute events overnight. Patient states he is doing well. Denies any significant back or leg pain. Drain output- 150mL total yesterday    REVIEW OF SYSTEMS :  Constitutional: Negative for chills and fever. Neurological: Negative for dizziness, tremors and speech change.      OBJECTIVE:   VITALS:  /81   Pulse 89   Temp 97.5 °F (36.4 °C) (Temporal)   Resp 16   Ht 5' 11\" (1.803 m)   Wt 215 lb (97.5 kg)   SpO2 97%   BMI 29.99 kg/m²     PHYSICAL:  Alert, oriented  Appears stated age  PERRL  EOMI  Strength full  Sensation intact to light touch  Dressing c/d/i    DATA:  CBC:   Lab Results   Component Value Date/Time    WBC 9.6 03/28/2023 05:52 AM    RBC 3.87 03/28/2023 05:52 AM    HGB 12.3 03/28/2023 05:52 AM    HCT 36.3 03/28/2023 05:52 AM    MCV 93.8 03/28/2023 05:52 AM    MCH 31.8 03/28/2023 05:52 AM    MCHC 33.9 03/28/2023 05:52 AM    RDW 12.5 03/28/2023 05:52 AM     03/28/2023 05:52 AM    MPV 10.5 03/28/2023 05:52 AM     BMP:    Lab Results   Component Value Date/Time     03/28/2023 05:52 AM    K 4.2 03/28/2023 05:52 AM     03/28/2023 05:52 AM    CO2 25 03/28/2023 05:52 AM    BUN 13 03/28/2023 05:52 AM    CREATININE 0.9 03/28/2023 05:52 AM    CALCIUM 9.1 03/28/2023 05:52 AM    LABGLOM >60 03/28/2023 05:52 AM    GLUCOSE 141 03/28/2023 05:52 AM     PT/INR:    Lab Results   Component Value Date/Time    PROTIME 12.3 03/17/2023 07:30 AM    INR 1.1 03/17/2023 07:30 AM     PTT:  No results found for: APTT, PTT[APTT}    Current Inpatient Medications  Current Facility-Administered Medications: amLODIPine (NORVASC) tablet 5 mg, 5 mg, Oral, Daily  cyclobenzaprine (FLEXERIL) tablet 10 mg, 10 mg, Oral, TID PRN  finasteride (PROSCAR) tablet 5 mg, 5 mg, Oral, Daily  levothyroxine (SYNTHROID) tablet 50 mcg, 50 mcg, Oral, Daily  pantoprazole (PROTONIX) tablet 40 mg, 40 mg, Oral, QAM
Discharge instructions given to patient and and he verbalizes understanding and has no questions .
Hospitalist consult sent to [facility]
Informed patient's wife, Maryjane Alamo of the following: Surgery scheduled on 3/27 time has been changed to 8:50 am will need to arrive at 6:50 am.  Spouse verbalized understanding, repeated arrival time.
Physical Therapy Initial Assessment     Name: Cheryl Madera  : 1960  MRN: [id number]      Date of Service: 3/27/2023    Evaluating PT:  Alen Stewart PT, DPT [id number]    Room #:  9158/9993-Z  Diagnosis:  Lumbar disc herniation with radiculopathy [M51.16]  Lumbosacral disc herniation [M51.27]  PMHx/PSHx:    Past Medical History:   Diagnosis Date    GERD (gastroesophageal reflux disease)     Hyperlipidemia     Hypertension     Low back pain      Procedure/Surgery: s/p  L4, L5 bilateral laminectomy for discectomy 3/27/23    Reason for admission: Lumbar disc herniation with radiculopathy [M51.16]  Lumbosacral disc herniation [M51.27]  Precautions:  spinal precautions, bulb drain, min fall risk   Equipment Needs:  TBD, none at this time     SUBJECTIVE:  Pt lives with wife in a 2 story home with no steps to enter. Bed and bath on main  floor with laundry on main floor. Pt ambulated with no AD PTA, owns [employer].    OBJECTIVE:   Initial Evaluation  Date: 3/27/23 Treatment Short Term/ Long Term   Goals   AM-PAC 6 Clicks      Was pt agreeable to Eval/treatment? Yes     Does pt have pain?  Min in back     Bed Mobility  Rolling: NT  Supine to sit: NT  Sit to supine: NT  Scooting: NT  Rolling: ind  Supine to sit: ind  Sit to supine: ind  Scooting: ind   Transfers Sit to stand: SBA  Stand to sit: SBA  Stand pivot: SBA no AD  Sit to stand: ind  Stand to sit: ind  Stand pivot: ind AAD   Ambulation    2x120 feet with no AD SBA  >200 feet with AAD ind   Stair negotiation: ascended and descended NT  5 steps with one rail ind   ROM BUE:  Refer to OT eval   BLE:  WNL     Strength BUE:  Refer to OT eval   BLE:  WNL     Balance Sitting EOB:  SBA  Dynamic Standing:  SBA  Sitting EOB:  ind  Dynamic Standing:  ind     Pt is A & O x 4  Sensation:  Pt denies numbness and tingling to extremities; notes hx of BLE n/t prior to surgery   Edema:  none noted     Vitals:  Blood Pressure at rest -- Blood Pressure post session --   Heart Rate at
filed at 3/28/2023 1046  Gross per 24 hour   Intake 600 ml   Output 150 ml   Net 450 ml       Labs:   Recent Labs     03/28/23  0552   WBC 9.6   HGB 12.3*   HCT 36.3*   *       Recent Labs     03/28/23  0552      K 4.2      CO2 25   BUN 13   CREATININE 0.9   CALCIUM 9.1       No results for input(s): PROT, ALB, ALKPHOS, ALT, AST, BILITOT, AMYLASE, LIPASE in the last 72 hours. No results for input(s): INR in the last 72 hours. No results for input(s): Krista Gazella in the last 72 hours. Chronic labs:  Lab Results   Component Value Date    INR 1.1 03/17/2023       Radiology:  Imaging studies reviewed today. ASSESSMENT:    Lumbar Radiculopathy   HTN  HLD  Hypothyroidism      PLAN:    Plan as per primary with neurosurgery  Blood pressure slightly above goal at times at 151 but patient in discomfort. Much better controlled today. Continue home dose of norvasc 5 mg. Continue crestor 20 mg daily for HLD  Synthroid 50 mcg daily for hypothyroidism. Diet: ADULT DIET; Regular  Code Status: Full Code      We will sign off. Please do not hesitate to call or reach out if you have any questions. We will be happy to see again if needed. Thank you for allowing us to participate in the care of this patient.     +++++++++++++++++++++++++++++++++++++++++++++++++  Keren Henry, [facility].  +++++++++++++++++++++++++++++++++++++++++++++++++  NOTE: This report was transcribed using voice recognition software. Every effort was made to ensure accuracy; however, inadvertent computerized transcription errors may be present.
numbness and tingling to extremities; notes hx of BLE n/t prior to surgery   Edema:  none noted     Vitals:  Blood Pressure at rest -- Blood Pressure post session --   Heart Rate at rest --  Heart Rate post session --   SPO2 at rest 98% SPO2 post session --     Therapeutic Exercises:  none performed this visit    Patient education  Pt educated on spinal precautions, gradual/safe return to activity at home     Patient response to education:   Pt verbalized understanding Pt demonstrated skill Pt requires further education in this area   x x      ASSESSMENT:    Conditions Requiring Skilled Therapeutic Intervention:    []Decreased strength     []Decreased ROM  [x]Decreased functional mobility  [x]Decreased balance   [x]Decreased endurance   []Decreased posture  [x]Decreased sensation  []Decreased coordination   []Decreased vision  [x]Decreased safety awareness   [x]Increased pain       Comments:  Pt in bedside chair on arrival and agreeable to PT tx. Pt cont to follow spinal precautions with mobility today. Pt ambulated extensive distances without fatigue, performed stair navigation with cuing for safety. Pt requested to remain in chair at end of session, left with all needs met and call light in reach. Treatment:  Patient practiced and was instructed in the following treatment:    Bed mobility: performed with cues for safety awareness and proper hand placement to promote improved functional independence. Transfer Training: STS, SPT without AD  Gait training: performed in hallway with fair gait speed, even step length      Pt's/ family goals   1. Return home safely. Prognosis is good for reaching above PT goals. Patient and or family understand(s) diagnosis, prognosis, and plan of care.   yes    PHYSICAL THERAPY PLAN OF CARE:    PT POC is established based on physician order and patient diagnosis     Referring provider/PT Order:    03/27/23 1400  PT eval and treat  Start:  03/27/23 1400,   End:  03/27/23
Glasses: yes WFL          Safety Good- cues for spinal precautions                                  good     Hand Dominance R    AROM (PROM) Strength Additional Info:    RUE  WFL 5/5 good  and wfl FMC/dexterity noted during ADL tasks       LUE WFL 5/5 good  and wfl FMC/dexterity noted during ADL tasks     Hearing: WFL   Sensation:   No c/o numbness or tingling   Tone: WFL   Edema: none noted    Comments: Upon arrival patient supine and agreeable to evaluation. Therapist educated pt regarding role of OT, spinal precautions and safety with ADL completion. Therapist facilitated donning of gown and socks , bed mobility utilizing log roll technique, unsupported sitting balance (addressing posture, weightshifting impacting ADLs), standing balance tasks, functional transfers (various surfaces) and functional ambulation with w/w - skilled cuing on hand placement, body mechanics and safety. Therapist facilitated self-care retraining: UB/LB self-care tasks, simulated toileting task and standing grooming task while educating pt on modified techniques within precautions, posture, safety and energy conservation techniques. Skilled monitoring of HR, O2 sats and pts response to treatment. Performed OT evaluation with education on spinal precautions and safety and modified techniques. At end of session, patient sitting up in chair eating and  with call light and phone within reach, all lines and tubes intact. Nursing notified. Overall patient demonstrated min  decreased independence and safety during completion of ADL/functional transfer/mobility tasks. Pt would benefit from continued skilled OT to increase safety and independence with completion of ADL/IADL tasks for functional independence and quality of life.     Treatment: OT treatment provided this date includes:   Instruction/training on safety and adapted techniques for completion of ADLs: to increase Dawes in self care within precautions  with
Time In: 8:35am           Treatment Time Out: 8:45am                Treatment Charges: Mins Units   Ther Ex  [id number]     Manual Therapy 72289     Thera Activities 78618     ADL/Home Mgt 59943 10 1   Neuro Re-ed 53080     Group Therapy      Orthotic manage/training  [id number]     Non-Billable Time     Total Timed Treatment 10 1        Roselia GUNDERSON/ADOLFO [id number]

## 2023-03-28 NOTE — OP NOTE
could be found. None  were found. Satisfied with the decompression, then I asked Anesthesia  to perform a Valsalva maneuver to ensure that no occult CSF leak or  hemostatic compromise could be found. None was found. I then placed  Duramorph and Depo-Medrol and piece of Gelfoam overlying the thecal sac  and exiting nerve roots and then placed a 7 mm Nabeel-Nguyen drain into  the wound, secured this to the skin using a 3-0 Monocryl suture in a  Mercy-type fashion. Wound was then closed using 0 Vicryl suture in  inverted interrupted fashion for the fascia followed by 2-0 Vicryl  suture in inverted interrupted fashion for the subcutaneous layers. The  skin was then closed using a running continuous 3-0 Monocryl suture. Antibiotic ointment was applied to the wound. Sterile dressings  followed. The patient was then turned into the supine position and  extubated without difficulty. He was transferred to the recovery room  in stable condition. All counts were correct and I certify I was  present for the case in its entirety.         Greyson Goode MD    D: 03/27/2023 16:28:50       T: 03/27/2023 22:43:49     [email or web address]  Job#: [id number]     Doc#: [id number]    CC:

## 2023-03-28 NOTE — ANESTHESIA POSTPROCEDURE EVALUATION
Department of Anesthesiology  Postprocedure Note    Patient: Everardo Jay  MRN: [id number]  YOB: 1960  Date of evaluation: 3/28/2023      Procedure Summary     Date: 03/27/23 Room / Location: [address] [facility]    Anesthesia Start: 5500 Anesthesia Stop: 9871    Procedure: L4, L5 bilateral laminectomy for discectomy (Bilateral: Back) Diagnosis:       Lumbar disc herniation with radiculopathy      (Lumbar disc herniation with radiculopathy [M51.16])    Surgeons: Venancio Byrd MD Responsible Provider: Iván Joy MD    Anesthesia Type: general ASA Status: 2          Anesthesia Type: No value filed.     Maikel Phase I: Maikel Score: 10    Maikel Phase II:        Anesthesia Post Evaluation    Patient location during evaluation: PACU  Patient participation: complete - patient participated  Level of consciousness: awake and alert  Airway patency: patent  Nausea & Vomiting: no nausea and no vomiting  Complications: no  Cardiovascular status: blood pressure returned to baseline and hemodynamically stable  Respiratory status: acceptable and spontaneous ventilation  Hydration status: euvolemic  Multimodal analgesia pain management approach

## 2023-03-28 NOTE — CARE COORDINATION
3/28 Care Coordination:POD#1,  L4, L5 bilateral laminectomy for discectomy. PTA Pt lives with wife in a 2 story home. Bed and bath on main  floor . Pt has  [employer], SPC at home. Independent with ADLs PT/OT dania AM-PAC 20/24. Plan at discharge is home with Wife. CM/SW will continue to follow for discharge planning.    Tiana AN,RN-CV-BC  [phone]

## 2023-03-29 NOTE — DISCHARGE SUMMARY
Neurosurgery Surgery Discharge Summary    [address]  SUMMARY:                The patient is a 57 y.o. male who was admitted to the hospital on 3/27/2023  5:51 AM for treatment of Lumbar Stenosis. On the day of admission, a L4-L5 laminectomy  was performed. The patient's hospital course was uncomplicated and consisted of physical therapy, incision observation, and a return to normal oral intake. The patient was discharged on 3/28/2023  6:41 PM tolerating a diet, moving bowels, and urinating without difficulty. The incisions were clean and intact. The patient was discharged to home in satisfactory condition with instructions to call the office for a follow up appointment. Hospital Problem List:  Principal Problem:    Lumbar radiculopathy  Active Problems:    Lumbosacral disc herniation  Resolved Problems:    * No resolved hospital problems. *     Procedure(s) (LRB):  L4, L5 bilateral laminectomy for discectomy (Bilateral)    Discharge Medications:   Discharge Medication List as of 3/28/2023  6:05 PM        START taking these medications    Details   oxyCODONE (ROXICODONE) 5 MG immediate release tablet Take 1 tablet by mouth every 4 hours as needed for Pain for up to 7 days. Max Daily Amount: 30 mg, Disp-42 tablet, R-0Normal           CONTINUE these medications which have NOT CHANGED    Details   amLODIPine (NORVASC) 5 MG tablet Take 5 mg by mouth dailyHistorical Med      omeprazole (PRILOSEC) 20 MG delayed release capsule Take 20 mg by mouth dailyHistorical Med      cyanocobalamin 1000 MCG tablet Take 1,000 mcg by mouth dailyHistorical Med      pregabalin (LYRICA) 150 MG capsule Take 150 mg by mouth in the morning, at noon, and at bedtime. Historical Med      finasteride (PROSCAR) 5 MG tablet Take 5 mg by mouth dailyHistorical Med      tamsulosin (FLOMAX) 0.4 MG capsule Take 0.4 mg by mouth dailyHistorical Med      levothyroxine (SYNTHROID) 50 MCG tablet Take 50 mcg by mouth

## 2023-04-04 ENCOUNTER — OFFICE VISIT (OUTPATIENT)
Dept: NEUROSURGERY | Age: 63
End: 2023-04-04
Payer: MEDICARE

## 2023-04-04 DIAGNOSIS — Z98.890 S/P LAMINECTOMY: Primary | ICD-10-CM

## 2023-04-04 PROCEDURE — 99212 OFFICE O/P EST SF 10 MIN: CPT

## 2023-04-04 PROCEDURE — 99024 POSTOP FOLLOW-UP VISIT: CPT | Performed by: STUDENT IN AN ORGANIZED HEALTH CARE EDUCATION/TRAINING PROGRAM

## 2023-04-04 NOTE — PROGRESS NOTES
Incision Check    Huong Greco is a 57 y.o. male who is s/p L4-L5 laminectomy who presents for an 1 week incision check. Incision non erythematous and non tender to touch. Incision healing well without signs of infection. No discharge noted. Okay to shower now. Use a mild shampoo (I.e. baby shampoo) to clean incision. Patient to return in 1 weeks for suture removal. Patient to call if they have any fever or discharge from incision site.

## 2023-05-11 ENCOUNTER — OFFICE VISIT (OUTPATIENT)
Dept: NEUROSURGERY | Age: 63
End: 2023-05-11
Payer: MEDICARE

## 2023-05-11 VITALS
BODY MASS INDEX: 30.1 KG/M2 | HEIGHT: 71 IN | WEIGHT: 215 LBS | OXYGEN SATURATION: 95 % | DIASTOLIC BLOOD PRESSURE: 84 MMHG | SYSTOLIC BLOOD PRESSURE: 155 MMHG | TEMPERATURE: 97.3 F | HEART RATE: 73 BPM

## 2023-05-11 DIAGNOSIS — Z98.890 S/P LAMINECTOMY: Primary | ICD-10-CM

## 2023-05-11 PROCEDURE — 99024 POSTOP FOLLOW-UP VISIT: CPT | Performed by: STUDENT IN AN ORGANIZED HEALTH CARE EDUCATION/TRAINING PROGRAM

## 2023-05-11 PROCEDURE — 99212 OFFICE O/P EST SF 10 MIN: CPT

## 2023-05-11 NOTE — PROGRESS NOTES
Post-Operative Follow-up     This is a 57year old male who presents to the office for a 1 month follow-up s/p L4-L5 laminectomy      Subjective: Patient states he is doing well. He denies any significant back pain. No pain in the legs. No numbness or weakness. No new complaints. Physical Exam:              WDWN, no apparent distress              Non-labored breathing               Vitals Stable              Alert and oriented x3              CN 3-12 intact              PERRL              EOMI              ALLISON well              Motor strength symmetric              Sensation to LT intact bilaterally   Incision healing well without signs of infection. Assessment: This is a 57 y.o.  male presenting for a 1 month follow-up s/p L4-L5 laminectomy      Plan:  -Pain control and expectations discussed  -Continue brace and restrictions   -OARRS report reviewed   -Follow-up in neurosurgery clinic in 2 month for 3 month follow up.  -Call or return to neurosurgery office sooner if symptoms worsen or if new issues arise in the interim.     Electronically signed by Laly Nelson PA-C on 5/11/2023 at 8:53 AM

## 2023-07-14 NOTE — DISCHARGE INSTRUCTIONS
Awake and alert VS stable Afeb In RR Clear chest Edema unchanged draining from the incision. A fever. Red streaks leading from the incision. Watch closely for changes in your health, and be sure to contact your doctor if:    You do not have a bowel movement after taking a laxative. You are not getting better as expected. Where can you learn more? Go to http://www.woods.com/ and enter U123 to learn more about \"Lumbar Laminectomy: What to Expect at Home. \"  Current as of: November 9, 2022               Content Version: 13.6  © 2006-2023 [employer]. Care instructions adapted under license by [facility] ([facility]). If you have questions about a medical condition or this instruction, always ask your healthcare professional. [address] any warranty or liability for your use of this information.

## 2023-07-18 ENCOUNTER — OFFICE VISIT (OUTPATIENT)
Dept: NEUROSURGERY | Age: 63
End: 2023-07-18
Payer: COMMERCIAL

## 2023-07-18 VITALS
OXYGEN SATURATION: 97 % | HEIGHT: 71 IN | WEIGHT: 215 LBS | DIASTOLIC BLOOD PRESSURE: 84 MMHG | BODY MASS INDEX: 30.1 KG/M2 | SYSTOLIC BLOOD PRESSURE: 167 MMHG | HEART RATE: 63 BPM | TEMPERATURE: 97.8 F

## 2023-07-18 DIAGNOSIS — Z98.890 S/P LAMINECTOMY: Primary | ICD-10-CM

## 2023-07-18 PROCEDURE — 99212 OFFICE O/P EST SF 10 MIN: CPT

## 2023-07-18 PROCEDURE — 99024 POSTOP FOLLOW-UP VISIT: CPT | Performed by: NEUROLOGICAL SURGERY

## 2023-08-09 DIAGNOSIS — M54.16 LUMBAR RADICULOPATHY: Primary | ICD-10-CM

## 2023-08-15 ENCOUNTER — OFFICE VISIT (OUTPATIENT)
Dept: NEUROSURGERY | Age: 63
End: 2023-08-15
Payer: COMMERCIAL

## 2023-08-15 ENCOUNTER — HOSPITAL ENCOUNTER (OUTPATIENT)
Dept: GENERAL RADIOLOGY | Age: 63
Discharge: HOME | End: 2023-08-17
Payer: COMMERCIAL

## 2023-08-15 ENCOUNTER — HOSPITAL ENCOUNTER (OUTPATIENT)
Age: 63
Discharge: HOME | End: 2023-08-17
Payer: COMMERCIAL

## 2023-08-15 VITALS
DIASTOLIC BLOOD PRESSURE: 84 MMHG | BODY MASS INDEX: 30.1 KG/M2 | HEART RATE: 61 BPM | WEIGHT: 215 LBS | HEIGHT: 71 IN | SYSTOLIC BLOOD PRESSURE: 147 MMHG | OXYGEN SATURATION: 93 % | TEMPERATURE: 97.9 F

## 2023-08-15 DIAGNOSIS — M54.16 LUMBAR RADICULOPATHY: ICD-10-CM

## 2023-08-15 DIAGNOSIS — Z98.890 S/P LAMINECTOMY: Primary | ICD-10-CM

## 2023-08-15 PROCEDURE — 4004F PT TOBACCO SCREEN RCVD TLK: CPT | Performed by: STUDENT IN AN ORGANIZED HEALTH CARE EDUCATION/TRAINING PROGRAM

## 2023-08-15 PROCEDURE — G8419 CALC BMI OUT NRM PARAM NOF/U: HCPCS | Performed by: STUDENT IN AN ORGANIZED HEALTH CARE EDUCATION/TRAINING PROGRAM

## 2023-08-15 PROCEDURE — 3017F COLORECTAL CA SCREEN DOC REV: CPT | Performed by: STUDENT IN AN ORGANIZED HEALTH CARE EDUCATION/TRAINING PROGRAM

## 2023-08-15 PROCEDURE — G8427 DOCREV CUR MEDS BY ELIG CLIN: HCPCS | Performed by: STUDENT IN AN ORGANIZED HEALTH CARE EDUCATION/TRAINING PROGRAM

## 2023-08-15 PROCEDURE — 99212 OFFICE O/P EST SF 10 MIN: CPT

## 2023-08-15 PROCEDURE — 1123F ACP DISCUSS/DSCN MKR DOCD: CPT | Performed by: STUDENT IN AN ORGANIZED HEALTH CARE EDUCATION/TRAINING PROGRAM

## 2023-08-15 PROCEDURE — 72100 X-RAY EXAM L-S SPINE 2/3 VWS: CPT

## 2023-08-15 PROCEDURE — 99213 OFFICE O/P EST LOW 20 MIN: CPT | Performed by: STUDENT IN AN ORGANIZED HEALTH CARE EDUCATION/TRAINING PROGRAM

## 2023-08-15 NOTE — PROGRESS NOTES
Office Follow-up     This is a 70year old male who presents to the office for a 5 month follow-up s/p L4-L5 laminectomy     Subjective: Patient states he is doing well. He denies any significant back pain. No pain down the legs. No numbness or weakness. No other complaints. Physical Exam:              WDWN, no apparent distress              Non-labored breathing               Vitals Stable              Alert and oriented x3              CN 3-12 intact              PERRL              EOMI              ALLISON well              Motor strength symmetric              Sensation to LT intact bilaterally     Imagin/15/2023 XR Lumbar Spine  No acute processes noted-final read pending     Assessment: This is a 70 y.o.  male presenting for a 5 month follow-up s/p L4-L5 laminectomy. Plan:  -Pain control and expectations discussed  -No restrictions   -OARRS report reviewed   -Follow-up in neurosurgery clinic prn   -Call or return to neurosurgery office sooner if symptoms worsen or if new issues arise in the interim.     Electronically signed by Calista Martinez PA-C on 8/15/2023 at 4:39 PM

## 2025-05-26 NOTE — TELEPHONE ENCOUNTER
Call to Gray Devries that procedure was approved for 1/24/2023 and that Arnaud should call him a few days before for the pre op call and between 2:00 PM and 4:00 PM  the business day before with the arrival time. Instructed Ly Marlow to hold ibuprofen for 24 hours, naprosyn or mobic for 4 days and any aspirin containing products or fish oil for 7 days. Instructed to call office back if any questions. Ly Marlow verbalized understanding.     Electronically signed by Forrest Hu RN on 1/19/2023 at 11:28 AM Received bedside report and assumed care of patient at change of shift. Patient is alert and oriented × 4, currently on RA. Patient reports sleep calm at this time. Initial assessment completed; Bed is in the lowest and locked position, call light and personal belongings placed within reach, and environment is safe and free of hazards. Fall risk precautions in place as appropriate. Patient verbalized understanding of plan of care and expressed no additional needs or concerns at this time. Will continue to monitor and reassess as needed.

## (undated) DEVICE — DRAIN SURG W7MMXL20CM SIL FULL PERF HUBLESS FLAT RADPQ STRP

## (undated) DEVICE — GLOVE ORANGE PI 7 1/2   MSG9075

## (undated) DEVICE — APPLICATOR PREP 26ML 0.7% IOD POVACRYLEX 74% ISO ALC ST

## (undated) DEVICE — SYRINGE, LUER LOCK, 5ML: Brand: MEDLINE

## (undated) DEVICE — DRAPE,REIN 53X77,STERILE: Brand: MEDLINE

## (undated) DEVICE — NEEDLE HYPO 18GA L1.5IN PNK POLYPR HUB S STL THN WALL FILL

## (undated) DEVICE — BLADE,STAINLESS-STEEL,10,STRL,DISPOSABLE: Brand: MEDLINE

## (undated) DEVICE — STICK SPONGE PRESAT PVP PAINT STERILE

## (undated) DEVICE — 3M™ IOBAN™ 2 ANTIMICROBIAL INCISE DRAPE 6650EZ: Brand: IOBAN™ 2

## (undated) DEVICE — GOWN,SIRUS,FABRNF,L,20/CS: Brand: MEDLINE

## (undated) DEVICE — WILSON FRAME KIT: Brand: MEDLINE INDUSTRIES, INC.

## (undated) DEVICE — Device

## (undated) DEVICE — SYRINGE IRRIG 60ML SFT PLIABLE BLB EZ TO GRP 1 HND USE W/

## (undated) DEVICE — Device: Brand: PORTEX

## (undated) DEVICE — TUBING, SUCTION, 3/16" X 12', STRAIGHT: Brand: MEDLINE

## (undated) DEVICE — 6 ML SYRINGE LUER-LOCK TIP: Brand: MONOJECT

## (undated) DEVICE — DRAIN SURG W7XL20CM SIL SMOOTH FLAT 3/4 PERF DBL WRP

## (undated) DEVICE — 3M(TM) MEDIPORE(TM) +PAD SOFT CLOTH ADHESIVE WOUND DRESSING 3569: Brand: 3M™ MEDIPORE™

## (undated) DEVICE — 5.0MM PRECISION ROUND

## (undated) DEVICE — CODMAN® SURGICAL PATTIES 1/2" X 1/2" (1.27CM X 1.27CM): Brand: CODMAN®

## (undated) DEVICE — LUMBAR LAMINECTOMY: Brand: MEDLINE INDUSTRIES, INC.

## (undated) DEVICE — NON-DEHP CATHETER EXTENSION SET, MALE LUER LOCK ADAPTER

## (undated) DEVICE — GAUZE,SPONGE,4"X4",12PLY,STERILE,LF,2'S: Brand: MEDLINE

## (undated) DEVICE — CATHETER ETER IV 20GA L1IN POLYUR STR RADPQ INTROCAN SFTY

## (undated) DEVICE — APPLICATOR MEDICATED 26 CC SOLUTION HI LT ORNG CHLORAPREP

## (undated) DEVICE — NEEDLE HYPO 25GA L1.5IN BLU POLYPR HUB S STL REG BVL STR

## (undated) DEVICE — E-Z CLEAN, NON-STICK, PTFE COATED, ELECTROSURGICAL BLADE ELECTRODE, MODIFIED EXTENDED INSULATION, 2.5 INCH (6.35 CM): Brand: MEGADYNE

## (undated) DEVICE — CATHETER IV 14 GA TRPL BVL LUERLOCK TIP RADIOPAQUE ANGIOCATH

## (undated) DEVICE — BANDAGE ADH W1XL3IN NAT FAB WVN FLX DURABLE N ADH PD SEAL

## (undated) DEVICE — ELECTRODE PT RET AD L9FT HI MOIST COND ADH HYDRGEL CORDED

## (undated) DEVICE — STANDARD HYPODERMIC NEEDLE,POLYPROPYLENE HUB: Brand: MONOJECT

## (undated) DEVICE — COVER,LIGHT HANDLE,FLX,2/PK: Brand: MEDLINE INDUSTRIES, INC.

## (undated) DEVICE — 12 ML SYRINGE,LUER-LOCK TIP: Brand: MONOJECT

## (undated) DEVICE — 3M™ RED DOT™ MONITORING ELECTRODE WITH FOAM TAPE AND STICKY GEL 2560, 50/BAG, 20/CASE, 72/PLT: Brand: RED DOT™

## (undated) DEVICE — DECANTER BAG 9": Brand: MEDLINE INDUSTRIES, INC.

## (undated) DEVICE — GLOVE SURG SZ 65 THK91MIL LTX FREE SYN POLYISOPRENE

## (undated) DEVICE — KIT SURG PREP POVIDONE IOD PRESATURATED PAINT WET FOR UNIV

## (undated) DEVICE — TOTAL TRAY, DB, 100% SILI FOLEY, 16FR 10: Brand: MEDLINE

## (undated) DEVICE — GAUZE,SPONGE,4"X4",16PLY,XRAY,STRL,LF: Brand: MEDLINE

## (undated) DEVICE — SYRINGE 20ML LL S/C 50

## (undated) DEVICE — BLADE CLIPPER GEN PURP NS